# Patient Record
Sex: FEMALE | Race: WHITE | Employment: UNEMPLOYED | URBAN - METROPOLITAN AREA
[De-identification: names, ages, dates, MRNs, and addresses within clinical notes are randomized per-mention and may not be internally consistent; named-entity substitution may affect disease eponyms.]

---

## 2013-08-30 LAB — COLONOSCOPY, EXTERNAL: NORMAL

## 2017-01-16 ENCOUNTER — OFFICE VISIT (OUTPATIENT)
Dept: FAMILY MEDICINE CLINIC | Age: 67
End: 2017-01-16

## 2017-01-16 VITALS
BODY MASS INDEX: 27.89 KG/M2 | OXYGEN SATURATION: 98 % | WEIGHT: 184 LBS | TEMPERATURE: 98.3 F | SYSTOLIC BLOOD PRESSURE: 125 MMHG | DIASTOLIC BLOOD PRESSURE: 73 MMHG | HEIGHT: 68 IN | HEART RATE: 59 BPM | RESPIRATION RATE: 18 BRPM

## 2017-01-16 DIAGNOSIS — F32.A DEPRESSION, UNSPECIFIED DEPRESSION TYPE: Primary | ICD-10-CM

## 2017-01-16 RX ORDER — FLUOXETINE HYDROCHLORIDE 40 MG/1
CAPSULE ORAL
Qty: 30 CAP | Refills: 5 | Status: SHIPPED | OUTPATIENT
Start: 2017-01-16 | End: 2017-09-30 | Stop reason: SDUPTHER

## 2017-01-16 RX ORDER — ARIPIPRAZOLE 2 MG/1
2 TABLET ORAL DAILY
Qty: 30 TAB | Refills: 5 | Status: SHIPPED | OUTPATIENT
Start: 2017-01-16 | End: 2018-01-05 | Stop reason: SDUPTHER

## 2017-01-16 NOTE — PROGRESS NOTES
Pt here for medication evaluation on Prozac. Would like to discuss increasing the dose of RX. States that she has been taking 2 pills daily x 1 month. Reports that she has noticed improvement since taking 40mg.

## 2017-01-16 NOTE — PROGRESS NOTES
Pt here for medication evaluation on Prozac. Would like to discuss increasing the dose of RX. States that she has been taking 2 pills daily x 1 month. Reports that she has noticed improvement since taking 40mg. Pt reports that she increased dose due to feeling very burned out and down. Subjective: (As above and below)     Chief Complaint   Patient presents with    Medication Evaluation     she is a 77y.o. year old female who presents for evaluation. Reviewed PmHx, RxHx, FmHx, SocHx, AllgHx and updated in chart. Review of Systems - negative except as listed above    Objective:     Vitals:    01/16/17 1515   BP: 125/73   Pulse: (!) 59   Resp: 18   Temp: 98.3 °F (36.8 °C)   TempSrc: Oral   SpO2: 98%   Weight: 184 lb (83.5 kg)   Height: 5' 8\" (1.727 m)     Physical Examination: General appearance - alert, well appearing, and in no distress  Mental status - behavior, speech, dress, motor activity, and thought processes, depressed mood  Eyes - pupils equal and reactive, extraocular eye movements intact  Mouth - mucous membranes moist, pharynx normal without lesions  Chest - clear to auscultation, no wheezes, rales or rhonchi, symmetric air entry  Heart - normal rate, regular rhythm, normal S1, S2, no murmurs, rubs, clicks or gallops    Assessment/ Plan:   1. Depression, unspecified depression type  Orders Placed This Encounter    FLUoxetine (PROZAC) 40 mg capsule     Sig: TAKE ONE CAPSULE BY MOUTH ONCE DAILY     Dispense:  30 Cap     Refill:  5    ARIPiprazole (ABILIFY) 2 mg tablet     Sig: Take 1 Tab by mouth daily. Dispense:  30 Tab     Refill:  5     Continue increased dose, add abilify. Follow up in 4-6 weeks for eval of new medication      Follow-up Disposition: As needed  I have discussed the diagnosis with the patient and the intended plan as seen in the above orders. The patient has received an after-visit summary and questions were answered concerning future plans.      Medication Side Effects and Warnings were discussed with patient: yes  Patient Labs were reviewed: yes  Patient Past Records were reviewed:  yes    Farhan Hooper M.D.

## 2017-01-16 NOTE — MR AVS SNAPSHOT
Visit Information Date & Time Provider Department Dept. Phone Encounter #  
 1/16/2017  3:15 PM Steven Nguyen MD [address] [facility] [phone] [id number] Upcoming Health Maintenance Date Due COLONOSCOPY 11/28/1968 DTaP/Tdap/Td series (1 - Tdap) 11/28/1971 ZOSTER VACCINE AGE 60> 11/28/2010 GLAUCOMA SCREENING Q2Y 11/28/2015 INFLUENZA AGE 9 TO ADULT 8/1/2016 Pneumococcal 65+ Low/Medium Risk (2 of 2 - PCV13) 12/1/2016 MEDICARE YEARLY EXAM 12/1/2016 BREAST CANCER SCRN MAMMOGRAM 1/11/2018 Allergies as of 1/16/2017  Review Complete On: 1/16/2017 By: Steven Nguyen MD  
  
 Severity Noted Reaction Type Reactions Nsaids (Non-steroidal Anti-inflammatory Drug)  03/14/2014   Systemic Other (comments) Bleeding ulcers Percocet [Oxycodone-acetaminophen]  03/14/2014   Intolerance Nausea and Vomiting Current Immunizations  Reviewed on 12/1/2015 Name Date Influenza Vaccine (Quad) PF 12/1/2015 Pneumococcal Polysaccharide (PPSV-23) 12/1/2015 Not reviewed this visit You Were Diagnosed With   
  
 Codes Comments Depression, unspecified depression type    -  Primary ICD-10-CM: F32.9 ICD-9-CM: 634 Vitals BP Pulse Temp Resp Height(growth percentile) Weight(growth percentile) 125/73 (BP 1 Location: Left arm, BP Patient Position: Sitting) (!) 59 98.3 °F (36.8 °C) (Oral) 18 5' 8\" (1.727 m) 184 lb (83.5 kg) SpO2 BMI OB Status Smoking Status 98% 27.98 kg/m2 Postmenopausal Never Smoker Vitals History BMI and BSA Data Body Mass Index Body Surface Area  
 27.98 kg/m 2 2 m 2 Preferred Pharmacy Pharmacy Name Phone [id number] [place] [address] [phone] Your Updated Medication List  
  
   
This list is accurate as of: 1/16/17  3:24 PM.  Always use your most recent med list.  
  
  
  
  
 alendronate 70 mg tablet Commonly known as:  FOSAMAX TAKE ONE TABLET BY MOUTH EVERY SUNDAY ARIPiprazole 2 mg tablet Commonly known as:  ABILIFY Take 1 Tab by mouth daily. Cyanocobalamin (Vitamin B-12) 250 mcg Chew Take 1 Cap by mouth daily. ergocalciferol 50,000 unit capsule Commonly known as:  ERGOCALCIFEROL  
TAKE ONE CAPSULE BY MOUTH EVERY SEVEN DAYS FLUoxetine 40 mg capsule Commonly known as:  PROzac TAKE ONE CAPSULE BY MOUTH ONCE DAILY FOLIC ACID PO Take 1 Tab by mouth daily. Iron 325 mg (65 mg iron) tablet Generic drug:  ferrous sulfate Take 325 mg by mouth Daily (before breakfast). multivitamin tablet Commonly known as:  ONE A DAY Take 1 Tab by mouth daily. mupirocin 2 % ointment Commonly known as:  [employer] Apply to affected area on right wrist up to twice daily. nystatin 100,000 unit/mL suspension Commonly known as:  MYCOSTATIN Prescriptions Sent to Pharmacy Refills FLUoxetine (PROZAC) 40 mg capsule 5 Sig: TAKE ONE CAPSULE BY MOUTH ONCE DAILY Class: Normal  
 Pharmacy: [address] Ph #: [phone] ARIPiprazole (ABILIFY) 2 mg tablet 5 Sig: Take 1 Tab by mouth daily. Class: Normal  
 Pharmacy: [place] [id number], [address] [place] [address] Ph #: [phone] Route: Oral  
  
Introducing [facility]! [employer] introduces [facility] patient portal. Now you can access parts of your medical record, email your doctor's office, and request medication refills online. 1. In your internet browser, go to [email or web address] 2. Click on the First Time User? Click Here link in the Sign In box. You will see the New Member Sign Up page. 3. Enter your [facility] Access Code exactly as it appears below. You will not need to use this code after youve completed the sign-up process.  If you do not sign up before the expiration date, you must request a new code. · [employer] Access Code: [email or web address] Expires: 4/16/2017  3:24 PM 
 
4. Enter the last four digits of your Social Security Number (xxxx) and Date of Birth (mm/dd/yyyy) as indicated and click Submit. You will be taken to the next sign-up page. 5. Create a [employer] ID. This will be your [employer] login ID and cannot be changed, so think of one that is secure and easy to remember. 6. Create a [employer] password. You can change your password at any time. 7. Enter your Password Reset Question and Answer. This can be used at a later time if you forget your password. 8. Enter your e-mail address. You will receive e-mail notification when new information is available in [address] 9. Click Sign Up. You can now view and download portions of your medical record. 10. Click the Download Summary menu link to download a portable copy of your medical information. If you have questions, please visit the Frequently Asked Questions section of the [employer] website. Remember, [employer] is NOT to be used for urgent needs. For medical emergencies, dial 911. Now available from your iPhone and Android! Please provide this summary of care documentation to your next provider. Your primary care clinician is listed as Uziel Luke. If you have any questions after today's visit, please call [phone].

## 2017-09-11 ENCOUNTER — APPOINTMENT (OUTPATIENT)
Dept: CT IMAGING | Age: 67
End: 2017-09-11
Attending: EMERGENCY MEDICINE
Payer: MEDICARE

## 2017-09-11 ENCOUNTER — HOSPITAL ENCOUNTER (EMERGENCY)
Age: 67
Discharge: HOME | End: 2017-09-11
Attending: EMERGENCY MEDICINE
Payer: MEDICARE

## 2017-09-11 VITALS
DIASTOLIC BLOOD PRESSURE: 71 MMHG | OXYGEN SATURATION: 98 % | RESPIRATION RATE: 21 BRPM | TEMPERATURE: 98.6 F | HEIGHT: 68 IN | SYSTOLIC BLOOD PRESSURE: 122 MMHG | WEIGHT: 174 LBS | HEART RATE: 80 BPM | BODY MASS INDEX: 26.37 KG/M2

## 2017-09-11 DIAGNOSIS — R07.89 RIGHT-SIDED CHEST WALL PAIN: Primary | ICD-10-CM

## 2017-09-11 LAB
ANION GAP SERPL CALC-SCNC: 8 MMOL/L (ref 5–15)
ATRIAL RATE: 59 BPM
BASOPHILS # BLD: 0 K/UL (ref 0–0.1)
BASOPHILS NFR BLD: 0 % (ref 0–1)
BUN SERPL-MCNC: 15 MG/DL (ref 6–20)
BUN/CREAT SERPL: 25 (ref 12–20)
CALCIUM SERPL-MCNC: 8.5 MG/DL (ref 8.5–10.1)
CALCULATED P AXIS, ECG09: -10 DEGREES
CALCULATED R AXIS, ECG10: 54 DEGREES
CALCULATED T AXIS, ECG11: 30 DEGREES
CHLORIDE SERPL-SCNC: 106 MMOL/L (ref 97–108)
CO2 SERPL-SCNC: 26 MMOL/L (ref 21–32)
CREAT SERPL-MCNC: 0.6 MG/DL (ref 0.55–1.02)
DIAGNOSIS, 93000: NORMAL
DIFFERENTIAL METHOD BLD: ABNORMAL
EOSINOPHIL # BLD: 0.1 K/UL (ref 0–0.4)
EOSINOPHIL NFR BLD: 2 % (ref 0–7)
ERYTHROCYTE [DISTWIDTH] IN BLOOD BY AUTOMATED COUNT: 19 % (ref 11.5–14.5)
GLUCOSE SERPL-MCNC: 87 MG/DL (ref 65–100)
HCT VFR BLD AUTO: 29.5 % (ref 35–47)
HGB BLD-MCNC: 8.1 G/DL (ref 11.5–16)
LYMPHOCYTES # BLD: 1.6 K/UL (ref 0.8–3.5)
LYMPHOCYTES NFR BLD: 27 % (ref 12–49)
MCH RBC QN AUTO: 19.6 PG (ref 26–34)
MCHC RBC AUTO-ENTMCNC: 27.5 G/DL (ref 30–36.5)
MCV RBC AUTO: 71.3 FL (ref 80–99)
MONOCYTES # BLD: 0.4 K/UL (ref 0–1)
MONOCYTES NFR BLD: 7 % (ref 5–13)
NEUTS SEG # BLD: 3.8 K/UL (ref 1.8–8)
NEUTS SEG NFR BLD: 64 % (ref 32–75)
P-R INTERVAL, ECG05: 142 MS
PLATELET # BLD AUTO: 313 K/UL (ref 150–400)
POTASSIUM SERPL-SCNC: 4 MMOL/L (ref 3.5–5.1)
Q-T INTERVAL, ECG07: 414 MS
QRS DURATION, ECG06: 88 MS
QTC CALCULATION (BEZET), ECG08: 409 MS
RBC # BLD AUTO: 4.14 M/UL (ref 3.8–5.2)
RBC MORPH BLD: ABNORMAL
SODIUM SERPL-SCNC: 140 MMOL/L (ref 136–145)
TROPONIN I SERPL-MCNC: <0.04 NG/ML
VENTRICULAR RATE, ECG03: 59 BPM
WBC # BLD AUTO: 5.9 K/UL (ref 3.6–11)

## 2017-09-11 PROCEDURE — 93005 ELECTROCARDIOGRAM TRACING: CPT

## 2017-09-11 PROCEDURE — 80048 BASIC METABOLIC PNL TOTAL CA: CPT | Performed by: EMERGENCY MEDICINE

## 2017-09-11 PROCEDURE — 85025 COMPLETE CBC W/AUTO DIFF WBC: CPT | Performed by: EMERGENCY MEDICINE

## 2017-09-11 PROCEDURE — 36415 COLL VENOUS BLD VENIPUNCTURE: CPT | Performed by: EMERGENCY MEDICINE

## 2017-09-11 PROCEDURE — 84484 ASSAY OF TROPONIN QUANT: CPT | Performed by: EMERGENCY MEDICINE

## 2017-09-11 PROCEDURE — 99285 EMERGENCY DEPT VISIT HI MDM: CPT

## 2017-09-11 PROCEDURE — 74011636320 HC RX REV CODE- 636/320: Performed by: RADIOLOGY

## 2017-09-11 PROCEDURE — 71275 CT ANGIOGRAPHY CHEST: CPT

## 2017-09-11 RX ORDER — TRAMADOL HYDROCHLORIDE AND ACETAMINOPHEN 37.5; 325 MG/1; MG/1
1 TABLET ORAL
Qty: 20 TAB | Refills: 0 | Status: SHIPPED | OUTPATIENT
Start: 2017-09-11 | End: 2018-05-21

## 2017-09-11 RX ORDER — SODIUM CHLORIDE 0.9 % (FLUSH) 0.9 %
5-10 SYRINGE (ML) INJECTION AS NEEDED
Status: DISCONTINUED | OUTPATIENT
Start: 2017-09-11 | End: 2017-09-11 | Stop reason: HOSPADM

## 2017-09-11 RX ORDER — SODIUM CHLORIDE 0.9 % (FLUSH) 0.9 %
5-10 SYRINGE (ML) INJECTION EVERY 8 HOURS
Status: DISCONTINUED | OUTPATIENT
Start: 2017-09-11 | End: 2017-09-11 | Stop reason: HOSPADM

## 2017-09-11 RX ADMIN — IOPAMIDOL 80 ML: 755 INJECTION, SOLUTION INTRAVENOUS at 11:54

## 2017-09-11 NOTE — ED PROVIDER NOTES
HPI Comments: 77 y.o. female with past medical history significant for anemia, PE, hypotension, hypoglycemia, PUD, cellulitis, OA, left breast lumpectomy, and gastric bypass surgery who presents from home with chief complaint of CP. Pt complains of right anterior wall CP worse with movement and inhalation. Pt states, \"it feels like there is abscess not a pulled muscle. \"  Additionally, pt notes fever for the past week with Tmax: 101.2 yesterday. Pt reports chronic joint pain with no acute changes. Pt denies recent weight change or hx of CA. Pt denies leg pain or swelling. Pt denies cough, sore throat, rhinorrhea, or congestion. Pt denies recent abx. There are no other acute medical concerns at this time. Social hx: -Tobacco use -EtOH use  PCP: Yan Oliver NP    Note written by Dimitris Baker. Nathalie Speaks, as dictated by Alea Karimi MD 10:40 AM      The history is provided by the patient. No  was used.         Past Medical History:   Diagnosis Date    Anemia NEC     Arthritis     bilat hands    Black stools     Bowel obstruction ([address] 10/24/2011    Chronic pain     left ankle    Dyspepsia and other specified disorders of function of stomach     Hair loss     Hypoglycemia, unspecified     related to hx bi-pass surg    Hypotension 11/2011    Liver disease     Hepatitis as child food borne    Morbid obesity ([address]     gastric bypass    Other ill-defined conditions(799.89)     bronchitis occaisionally    Other ill-defined conditions(799.89) 2012    cellulitis right leg,     Other ill-defined conditions(799.89)     hx of hypotenion and bradycardia    Other ill-defined conditions(799.89)     osteoporosis    Other ill-defined conditions(799.89)     depression    Other ill-defined conditions(799.89) 12/12/12    MRSA    PUD (peptic ulcer disease) 5-2013    bleeding ulcer- from NSAIDS- no transfusion    Pulmonary embolus ([address] 11/3/2011    Teeth problem     Thromboembolus ([facility] 2011    post op from pic line, pe lung    Ulcers, marginal     GI       Past Surgical History:   Procedure Laterality Date    COLONOSCOPY  2013         EGD  2013         GASTRIC BYPASS,OBESE<150CM ROBERTO-EN-Y      HX BREAST BIOPSY  2014    LEFT NIPPLE EXPLORATION AND DUCTAL EXCISION, EXCISION SCALP MASS  performed by Brice Calzada MD at [address] HX BREAST LUMPECTOMY Left 2014    HX GASTRIC BYPASS      HX OPEN CHOLECYSTECTOMY      taaken with bypass    HX ORTHOPAEDIC      right ankle ORIF         Family History:   Problem Relation Age of Onset    Cancer Mother      colon    Heart Disease Father     Alcohol abuse Brother          Cancer Maternal Aunt      colon       Social History     Social History    Marital status:      Spouse name: N/A    Number of children: N/A    Years of education: N/A     Occupational History    Not on file. Social History Main Topics    Smoking status: Never Smoker    Smokeless tobacco: Never Used    Alcohol use No    Drug use: No    Sexual activity: Yes     Partners: Male     Other Topics Concern    Not on file     Social History Narrative         ALLERGIES: Nsaids (non-steroidal anti-inflammatory drug) and Percocet [oxycodone-acetaminophen]    Review of Systems   Constitutional: Positive for fever. Negative for appetite change and unexpected weight change. HENT: Negative. Negative for ear pain, hearing loss, nosebleeds, rhinorrhea, sore throat and trouble swallowing. Respiratory: Negative. Negative for cough, chest tightness and shortness of breath. Cardiovascular: Positive for chest pain (right anterior wall). Negative for palpitations. Gastrointestinal: Negative. Negative for abdominal distention, abdominal pain, blood in stool and vomiting. Endocrine: Negative. Genitourinary: Negative for dysuria and hematuria. Musculoskeletal: Negative. Negative for back pain and myalgias. Skin: Negative. Negative for rash. Allergic/Immunologic: Negative. Neurological: Negative. Negative for dizziness, syncope, weakness and numbness. Hematological: Negative. Psychiatric/Behavioral: Negative. All other systems reviewed and are negative. Vitals:    09/11/17 1029   BP: 126/58   Pulse: (!) 59   Resp: 16   Temp: 98.6 °F (37 °C)   SpO2: 98%   Weight: 78.9 kg (174 lb)   Height: 5' 8\" (1.727 m)            Physical Exam   Constitutional: She is oriented to person, place, and time. She appears well-developed and well-nourished. She appears distressed (moderate d/t right anterior CP). HENT:   Head: Normocephalic and atraumatic. Right Ear: External ear normal.   Left Ear: External ear normal.   Nose: Nose normal.   Mouth/Throat: Oropharynx is clear and moist.   Eyes: Conjunctivae and EOM are normal. Pupils are equal, round, and reactive to light. Neck: Normal range of motion. Neck supple. No JVD present. No thyromegaly present. Cardiovascular: Regular rhythm, normal heart sounds and intact distal pulses. No murmur heard. Pulmonary/Chest: Effort normal and breath sounds normal. No respiratory distress. She has no wheezes. She has no rales. She exhibits tenderness (very reproducible chest discomfort to palpation). No Crepitus. Breath sounds clear bilaterally. Abdominal: Soft. Bowel sounds are normal. She exhibits no distension. There is no tenderness. Musculoskeletal: Normal range of motion. She exhibits no edema or tenderness. Neurological: She is alert and oriented to person, place, and time. No cranial nerve deficit. Skin: Skin is warm and dry. No rash noted. Psychiatric: She has a normal mood and affect. Her behavior is normal. Thought content normal.      Note written by [facility]. Ramesh Rea, as dictated by Lauri Wilkins MD 11:00 AM      [facility]  ED Course       Procedures  ED EKG interpretation:  Rhythm: sinus bradycardia; Rate (approx.): 59;  Axis: normal; ST/T wave: normal; no ectopy. Note written by [facility]. Nathalie Speaks, as dictated by Alea Karimi MD 10:38 AM    PROGRESS NOTE:  1:13 PM  Troponin is negative. Chemistry is unremarkable. CBC shows a chronic anemia, HGB 8.1. Her chest CT shows no evidence of acute pulmonary embolus, no acute findings, and thoracic aorta is normal.    A/P: Chest wall pain with no clear etiology for stated fever, no indication for abx at this time. Will give short term analgesics and have pt f/u with PCP later this week.

## 2017-09-11 NOTE — DISCHARGE INSTRUCTIONS
Musculoskeletal Chest Pain: Care Instructions  Your Care Instructions  Chest pain is not always a sign that something is wrong with your heart or that you have another serious problem. The doctor thinks your chest pain is caused by strained muscles or ligaments, inflamed chest cartilage, or another problem in your chest, rather than by your heart. You may need more tests to find the cause of your chest pain. Follow-up care is a key part of your treatment and safety. Be sure to make and go to all appointments, and call your doctor if you are having problems. Its also a good idea to know your test results and keep a list of the medicines you take. How can you care for yourself at home? · Take pain medicines exactly as directed. ¨ If the doctor gave you a prescription medicine for pain, take it as prescribed. ¨ If you are not taking a prescription pain medicine, ask your doctor if you can take an over-the-counter medicine. · Rest and protect the sore area. · Stop, change, or take a break from any activity that may be causing your pain or soreness. · Put ice or a cold pack on the sore area for 10 to 20 minutes at a time. Try to do this every 1 to 2 hours for the next 3 days (when you are awake) or until the swelling goes down. Put a thin cloth between the ice and your skin. · After 2 or 3 days, apply a heating pad set on low or a warm cloth to the area that hurts. Some doctors suggest that you go back and forth between hot and cold. · Do not wrap or tape your ribs for support. This may cause you to take smaller breaths, which could increase your risk of lung problems. · Mentholated creams such as Bengay or Icy Hot may soothe sore muscles. Follow the instructions on the package. · Follow your doctor's instructions for exercising. · Gentle stretching and massage may help you get better faster. Stretch slowly to the point just before pain begins, and hold the stretch for at least 15 to 30 seconds.  Do this 3 or 4 times a day. Stretch just after you have applied heat. · As your pain gets better, slowly return to your normal activities. Any increased pain may be a sign that you need to rest a while longer. When should you call for help? Call 911 anytime you think you may need emergency care. For example, call if:  · You have chest pain or pressure. This may occur with:  ¨ Sweating. ¨ Shortness of breath. ¨ Nausea or vomiting. ¨ Pain that spreads from the chest to the neck, jaw, or one or both shoulders or arms. ¨ Dizziness or lightheadedness. ¨ A fast or uneven pulse. After calling 911, chew 1 adult-strength aspirin. Wait for an ambulance. Do not try to drive yourself. · You have sudden chest pain and shortness of breath, or you cough up blood. Call your doctor now or seek immediate medical care if:  · You have any trouble breathing. · Your chest pain gets worse. · Your chest pain occurs consistently with exercise and is relieved by rest.  Watch closely for changes in your health, and be sure to contact your doctor if:  · Your chest pain does not get better after 1 week. Where can you learn more? Go to http://jamaal-roxie.info/. Enter V293 in the search box to learn more about \"Musculoskeletal Chest Pain: Care Instructions. \"  Current as of: March 20, 2017  Content Version: 11.3  © 8484-5740 [employer]. Care instructions adapted under license by [employer] (which disclaims liability or warranty for this information). If you have questions about a medical condition or this instruction, always ask your healthcare professional. [place] [address] any warranty or liability for your use of this information. We hope that we have addressed all of your medical concerns. The examination and treatment you received in the Emergency Department were for an emergent problem and were not intended as complete care.  It is important that you follow up with your healthcare provider(s) for ongoing care. If your symptoms worsen or do not improve as expected, and you are unable to reach your usual health care provider(s), you should return to the Emergency Department. Today's healthcare is undergoing tremendous change, and patient satisfaction surveys are one of the many tools to assess the quality of medical care. You may receive a survey from the [employer] organization regarding your experience in the Emergency Department. I hope that your experience has been completely positive, particularly the medical care that I provided. As such, please participate in the survey; anything less than excellent does not meet my expectations or intentions. [facility] and [facility] participate in nationally recognized quality of care measures. If your blood pressure is greater than 120/80, as reported below, we urge that you seek medical care to address the potential of high blood pressure, commonly known as hypertension. Hypertension can be hereditary or can be caused by certain medical conditions, pain, stress, or \"white coat syndrome. \"       Please make an appointment with your health care provider(s) for follow up of your Emergency Department visit. VITALS:   Patient Vitals for the past 8 hrs:   Temp Pulse Resp BP SpO2   09/11/17 1130 - (!) 52 17 114/58 99 %   09/11/17 1115 - 62 14 105/57 100 %   09/11/17 1100 - - - 121/76 99 %   09/11/17 1029 98.6 °F (37 °C) (!) 59 16 126/58 98 %          Thank you for allowing us to provide you with medical care today. We realize that you have many choices for your emergency care needs. Please choose us in the future for any continued health care needs. Regards,           Paul Gutiérrez, [address] [place], [place] [address]y 20.   Office: [phone]            Recent Results (from the past 24 hour(s))   TROPONIN I    Collection Time: 09/11/17 10:53 AM   Result Value Ref Range    Troponin-I, Qt. <0.04 <5.05 ng/mL   METABOLIC PANEL, BASIC    Collection Time: 09/11/17 10:53 AM   Result Value Ref Range    Sodium 140 136 - 145 mmol/L    Potassium 4.0 3.5 - 5.1 mmol/L    Chloride 106 97 - 108 mmol/L    CO2 26 21 - 32 mmol/L    Anion gap 8 5 - 15 mmol/L    Glucose 87 65 - 100 mg/dL    BUN 15 6 - 20 MG/DL    Creatinine 0.60 0.55 - 1.02 MG/DL    BUN/Creatinine ratio 25 (H) 12 - 20      GFR est AA >60 >60 ml/min/1.73m2    GFR est non-AA >60 >60 ml/min/1.73m2    Calcium 8.5 8.5 - 10.1 MG/DL   CBC WITH AUTOMATED DIFF    Collection Time: 09/11/17 10:53 AM   Result Value Ref Range    WBC 5.9 3.6 - 11.0 K/uL    RBC 4.14 3.80 - 5.20 M/uL    HGB 8.1 (L) 11.5 - 16.0 g/dL    HCT 29.5 (L) 35.0 - 47.0 %    MCV 71.3 (L) 80.0 - 99.0 FL    MCH 19.6 (L) 26.0 - 34.0 PG    MCHC 27.5 (L) 30.0 - 36.5 g/dL    RDW 19.0 (H) 11.5 - 14.5 %    PLATELET 856 127 - 265 K/uL    NEUTROPHILS 64 32 - 75 %    LYMPHOCYTES 27 12 - 49 %    MONOCYTES 7 5 - 13 %    EOSINOPHILS 2 0 - 7 %    BASOPHILS 0 0 - 1 %    ABS. NEUTROPHILS 3.8 1.8 - 8.0 K/UL    ABS. LYMPHOCYTES 1.6 0.8 - 3.5 K/UL    ABS. MONOCYTES 0.4 0.0 - 1.0 K/UL    ABS. EOSINOPHILS 0.1 0.0 - 0.4 K/UL    ABS. BASOPHILS 0.0 0.0 - 0.1 K/UL    DF SMEAR SCANNED      RBC COMMENTS ANISOCYTOSIS  1+        RBC COMMENTS HYPOCHROMIA  2+        RBC COMMENTS MICROCYTOSIS  1+           Cta Chest W Or W Wo Cont    Result Date: 9/11/2017  INDICATION: Right-sided pleuritic chest pain with fever COMPARISON:None TECHNIQUE:  Routine noncontrast imaging the chest was performed for localization purposes. Then, following the uneventful intravenous administration of 80 cc [id number], thin helical axial images were obtained through the chest. 3D image postprocessing was performed. CT dose reduction was achieved through use of a standardized protocol tailored for this examination and automatic exposure control for dose modulation. FINDINGS: THYROID: No nodule.  MEDIASTINUM: No mass or lymphadenopathy. JENIFFER: No mass or lymphadenopathy. THORACIC AORTA: No dissection or aneurysm. PULMONARY ARTERIES: Main pulmonary artery is normal in caliber. No evidence of acute pulmonary emboli. TRACHEA/BRONCHI: Patent. ESOPHAGUS: No wall thickening or dilatation. HEART: Normal in size. PLEURA: No effusion or pneumothorax. LUNGS: No pulmonary nodule. Mild bilateral dependent atelectasis. INCIDENTALLY IMAGED UPPER ABDOMEN: Status post gastric bypass. BONES: No destructive bone lesion. ADDITIONAL COMMENTS: N/A     IMPRESSION: No evidence of acute pulmonary embolus. Mild bilateral dependent atelectasis. Status post gastric bypass.

## 2017-09-11 NOTE — ED TRIAGE NOTES
Patient c/o chest pain x 2 weeks that increases with movement and inhalation. Patient c/o fever x 1 week.

## 2017-10-01 RX ORDER — FLUOXETINE HYDROCHLORIDE 40 MG/1
CAPSULE ORAL
Qty: 30 CAP | Refills: 5 | Status: SHIPPED | OUTPATIENT
Start: 2017-10-01 | End: 2018-05-16 | Stop reason: SDUPTHER

## 2018-01-05 RX ORDER — ARIPIPRAZOLE 2 MG/1
TABLET ORAL
Qty: 30 TAB | Refills: 5 | Status: SHIPPED | COMMUNITY
Start: 2018-01-05 | End: 2019-02-04 | Stop reason: SDUPTHER

## 2018-01-16 ENCOUNTER — TELEPHONE (OUTPATIENT)
Dept: FAMILY MEDICINE CLINIC | Age: 68
End: 2018-01-16

## 2018-01-16 NOTE — TELEPHONE ENCOUNTER
Call placed to patient @ [phone] , voice mail left to return call to the office. If patient should return call, please inform patient there is no documentation provided regarding  the nature of the previous call she was referring to, this writer is unsure who called or why.

## 2018-01-16 NOTE — TELEPHONE ENCOUNTER
Pt states she received a phone call from office unsure of nature of call. Erik Scott of call could be related to [employer].  Cb # [phone]

## 2018-05-17 RX ORDER — FLUOXETINE HYDROCHLORIDE 40 MG/1
CAPSULE ORAL
Qty: 30 CAP | Refills: 5 | Status: SHIPPED | OUTPATIENT
Start: 2018-05-17 | End: 2018-05-21 | Stop reason: DRUGHIGH

## 2018-05-21 ENCOUNTER — OFFICE VISIT (OUTPATIENT)
Dept: FAMILY MEDICINE CLINIC | Age: 68
End: 2018-05-21

## 2018-05-21 VITALS
TEMPERATURE: 98.7 F | OXYGEN SATURATION: 99 % | HEART RATE: 72 BPM | WEIGHT: 180 LBS | DIASTOLIC BLOOD PRESSURE: 65 MMHG | BODY MASS INDEX: 27.28 KG/M2 | RESPIRATION RATE: 16 BRPM | HEIGHT: 68 IN | SYSTOLIC BLOOD PRESSURE: 110 MMHG

## 2018-05-21 DIAGNOSIS — Z98.84 S/P GASTRIC BYPASS: ICD-10-CM

## 2018-05-21 DIAGNOSIS — E55.9 VITAMIN D DEFICIENCY: ICD-10-CM

## 2018-05-21 DIAGNOSIS — E53.8 VITAMIN B12 DEFICIENCY: ICD-10-CM

## 2018-05-21 DIAGNOSIS — D50.9 IRON DEFICIENCY ANEMIA, UNSPECIFIED IRON DEFICIENCY ANEMIA TYPE: Primary | ICD-10-CM

## 2018-05-21 DIAGNOSIS — F32.A DEPRESSION, UNSPECIFIED DEPRESSION TYPE: ICD-10-CM

## 2018-05-21 PROBLEM — Z86.711 HX PULMONARY EMBOLISM: Status: ACTIVE | Noted: 2018-05-21

## 2018-05-21 RX ORDER — FLUOXETINE HYDROCHLORIDE 20 MG/1
60 CAPSULE ORAL DAILY
Qty: 90 CAP | Refills: 2 | Status: SHIPPED | OUTPATIENT
Start: 2018-05-21 | End: 2018-09-12 | Stop reason: SDUPTHER
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
## 2018-05-21 NOTE — PATIENT INSTRUCTIONS
Iron-Rich Diet: Care Instructions  Your Care Instructions    Your body needs iron to make hemoglobin. Hemoglobin is a substance in red blood cells that carries oxygen from the lungs to cells all through your body. If you do not get enough iron, your body makes fewer and smaller red blood cells. As a result, your body's cells may not get enough oxygen. Adult men need 8 milligrams of iron a day; adult women need 18 milligrams of iron a day. After menopause, women need 8 milligrams of iron a day. A pregnant woman needs 27 milligrams of iron a day. Infants and young children have higher iron needs relative to their size than other age groups. People who have lost blood because of ulcers or heavy menstrual periods may become very low in iron and may develop anemia. Most people can get the iron their bodies need by eating enough of certain iron-rich foods. Your doctor may recommend that you take an iron supplement along with eating an iron-rich diet. Follow-up care is a key part of your treatment and safety. Be sure to make and go to all appointments, and call your doctor if you are having problems. It's also a good idea to know your test results and keep a list of the medicines you take. How can you care for yourself at home? · Make iron-rich foods a part of your daily diet. Iron-rich foods include:  ¨ All meats, such as chicken, beef, lamb, pork, fish, and shellfish. Liver is especially high in iron. ¨ Leafy green vegetables. ¨ Raisins, peas, beans, lentils, barley, and eggs. ¨ Iron-fortified breakfast cereals. · Eat foods with vitamin C along with iron-rich foods. Vitamin C helps you absorb more iron from food. Drink a glass of orange juice or another citrus juice with your food. · Eat meat and vegetables or grains together. The iron in meat helps your body absorb the iron in other foods. Where can you learn more? Go to http://jamaal-roxie.info/.   Enter [id number] [id number] in the search box to learn more about \"Iron-Rich Diet: Care Instructions. \"  Current as of: May 12, 2017  Content Version: 11.4  © 3932-4241 Healthwise, [employer]. Care instructions adapted under license by [employer]erlife (which disclaims liability or warranty for this information). If you have questions about a medical condition or this instruction, always ask your healthcare professional. [address] any warranty or liability for your use of this information.

## 2018-05-21 NOTE — MR AVS SNAPSHOT
[address] [address] 
[phone] Patient: Brock Macdonald MRN:  [id number] Visit Information Date & Time Provider Department Dept. Phone Encounter #  
 5/21/2018  9:45 AM Zoila Mills NP [address] [facility] [phone] [id number] Follow-up Instructions Return if symptoms worsen or fail to improve. Upcoming Health Maintenance Date Due COLONOSCOPY 11/28/1968 DTaP/Tdap/Td series (1 - Tdap) 11/28/1971 ZOSTER VACCINE AGE 60> 9/28/2010 GLAUCOMA SCREENING Q2Y 11/28/2015 Pneumococcal 65+ Low/Medium Risk (2 of 2 - PCV13) 12/1/2016 BREAST CANCER SCRN MAMMOGRAM 1/11/2018 MEDICARE YEARLY EXAM 3/28/2018 Influenza Age 5 to Adult 8/1/2018 Allergies as of 5/21/2018  Review Complete On: 5/21/2018 By: Antonio Head LPN Severity Noted Reaction Type Reactions Nsaids (Non-steroidal Anti-inflammatory Drug)  03/14/2014   Systemic Other (comments) Bleeding ulcers Percocet [Oxycodone-acetaminophen]  03/14/2014   Intolerance Nausea and Vomiting Current Immunizations  Reviewed on 12/1/2015 Name Date Influenza Vaccine (Quad) PF 12/1/2015 Pneumococcal Polysaccharide (PPSV-23) 12/1/2015 Not reviewed this visit You Were Diagnosed With   
  
 Codes Comments Iron deficiency anemia, unspecified iron deficiency anemia type    -  Primary ICD-10-CM: D50.9 ICD-9-CM: 280.9 Vitamin D deficiency     ICD-10-CM: E55.9 ICD-9-CM: 268.9 Vitamin B12 deficiency     ICD-10-CM: E53.8 ICD-9-CM: 266.2 Depression, unspecified depression type     ICD-10-CM: F32.9 ICD-9-CM: 804 S/P gastric bypass     ICD-10-CM: H31.01 ICD-9-CM: V45.86 Vitals BP Pulse Temp Resp Height(growth percentile) Weight(growth percentile) 110/65 72 98.7 °F (37.1 °C) (Oral) 16 5' 8\" (1.727 m) 180 lb (81.6 kg) SpO2 BMI OB Status Smoking Status 99% 27.37 kg/m2 Postmenopausal Never Smoker Vitals History BMI and BSA Data Body Mass Index Body Surface Area  
 27.37 kg/m 2 1.98 m 2 Preferred Pharmacy Pharmacy Name Phone [address] [phone] Your Updated Medication List  
  
   
This list is accurate as of 5/21/18 10:12 AM.  Always use your most recent med list.  
  
  
  
  
 alendronate 70 mg tablet Commonly known as:  FOSAMAX TAKE ONE TABLET BY MOUTH EVERY SUNDAY ARIPiprazole 2 mg tablet Commonly known as:  ABILIFY TAKE ONE TABLET BY MOUTH ONCE DAILY Cyanocobalamin (Vitamin B-12) 250 mcg Chew Take 1 Cap by mouth daily. ergocalciferol 50,000 unit capsule Commonly known as:  ERGOCALCIFEROL  
TAKE ONE CAPSULE BY MOUTH EVERY SEVEN DAYS FLUoxetine 20 mg capsule Commonly known as:  PROzac Take 3 Caps by mouth daily. FOLIC ACID PO Take 1 Tab by mouth daily. multivitamin tablet Commonly known as:  ONE A DAY Take 1 Tab by mouth daily. Prescriptions Sent to Pharmacy Refills FLUoxetine (PROZAC) 20 mg capsule 2 Sig: Take 3 Caps by mouth daily. Class: Normal  
 Pharmacy: [facility] [address] Ph #: [phone] Route: Oral  
  
We Performed the Following FERRITIN [09046 CPT(R)] IRON PROFILE [id number] CPT(R)] VITAMIN B12 & FOLATE [95027 CPT(R)] VITAMIN D, 25 HYDROXY [id number] CPT(R)] Follow-up Instructions Return if symptoms worsen or fail to improve. Patient Instructions Iron-Rich Diet: Care Instructions Your Care Instructions Your body needs iron to make hemoglobin. Hemoglobin is a substance in red blood cells that carries oxygen from the lungs to cells all through your body.  If you do not get enough iron, your body makes fewer and smaller red blood cells. As a result, your body's cells may not get enough oxygen. Adult men need 8 milligrams of iron a day; adult women need 18 milligrams of iron a day. After menopause, women need 8 milligrams of iron a day. A pregnant woman needs 27 milligrams of iron a day. Infants and young children have higher iron needs relative to their size than other age groups. People who have lost blood because of ulcers or heavy menstrual periods may become very low in iron and may develop anemia. Most people can get the iron their bodies need by eating enough of certain iron-rich foods. Your doctor may recommend that you take an iron supplement along with eating an iron-rich diet. Follow-up care is a key part of your treatment and safety. Be sure to make and go to all appointments, and call your doctor if you are having problems. It's also a good idea to know your test results and keep a list of the medicines you take. How can you care for yourself at home? · Make iron-rich foods a part of your daily diet. Iron-rich foods include: ¨ All meats, such as chicken, beef, lamb, pork, fish, and shellfish. Liver is especially high in iron. ¨ Leafy green vegetables. ¨ Raisins, peas, beans, lentils, barley, and eggs. ¨ Iron-fortified breakfast cereals. · Eat foods with vitamin C along with iron-rich foods. Vitamin C helps you absorb more iron from food. Drink a glass of orange juice or another citrus juice with your food. · Eat meat and vegetables or grains together. The iron in meat helps your body absorb the iron in other foods. Where can you learn more? Go to http://jamaal-roxie.info/. Enter [id number] in the search box to learn more about \"Iron-Rich Diet: Care Instructions. \" Current as of: May 12, 2017 Content Version: 11.4 © 3041-2560 BuildingIQ.  Care instructions adapted under license by [employer] (which disclaims liability or warranty for this information). If you have questions about a medical condition or this instruction, always ask your healthcare professional. [address] any warranty or liability for your use of this information. Introducing [facility]! [employer] introduces [employer] patient portal. Now you can access parts of your medical record, email your doctor's office, and request medication refills online. 1. In your internet browser, go to [email or web address] 2. Click on the First Time User? Click Here link in the Sign In box. You will see the New Member Sign Up page. 3. Enter your [employer] Access Code exactly as it appears below. You will not need to use this code after youve completed the sign-up process. If you do not sign up before the expiration date, you must request a new code. · [employer] Access Code: [email or web address] Expires: 8/19/2018 10:12 AM 
 
4. Enter the last four digits of your Social Security Number (xxxx) and Date of Birth (mm/dd/yyyy) as indicated and click Submit. You will be taken to the next sign-up page. 5. Create a [employer] ID. This will be your [employer] login ID and cannot be changed, so think of one that is secure and easy to remember. 6. Create a [employer] password. You can change your password at any time. 7. Enter your Password Reset Question and Answer. This can be used at a later time if you forget your password. 8. Enter your e-mail address. You will receive e-mail notification when new information is available in [address] 9. Click Sign Up. You can now view and download portions of your medical record. 10. Click the Download Summary menu link to download a portable copy of your medical information. If you have questions, please visit the Frequently Asked Questions section of the [employer] website. Remember, [employer] is NOT to be used for urgent needs. For medical emergencies, dial 911. Now available from your iPhone and Android! Please provide this summary of care documentation to your next provider. Your primary care clinician is listed as rIish Salinas. If you have any questions after today's visit, please call [phone].

## 2018-05-21 NOTE — LETTER
5/30/2018 3:46 PM 
 
Ms. Booker Estrada [address] [place] [id number] Dear Booker Estrada: 
 
Please find your most recent results below. Resulted Orders IRON PROFILE Result Value Ref Range TIBC 439 250 - 450 ug/dL UIBC 426 (H) 118 - 369 ug/dL Iron 13 (L) 27 - 139 ug/dL Iron % saturation 3 (LL) 15 - 55 % Narrative Performed at:  [address]  [id number] : Tori Vicente MD, Phone:  [phone] VITAMIN B12 & FOLATE Result Value Ref Range Vitamin B12 159 (L) 232 - 1245 pg/mL Folate >20.0 >3.0 ng/mL Comment: A serum folate concentration of less than 3.1 ng/mL is 
considered to represent clinical deficiency. Narrative Performed at:  [address]  [id number] : Tori Vicente MD, Phone:  [phone] FERRITIN Result Value Ref Range Ferritin 6 (L) 15 - 150 ng/mL Narrative Performed at:  [address]  [id number] : Tori Vicente MD, Phone:  [phone] VITAMIN D, 25 HYDROXY Result Value Ref Range VITAMIN D, 25-HYDROXY 7.2 (L) 30.0 - 100.0 ng/mL Comment:  
   Vitamin D deficiency has been defined by the [address] [place] [address] practice guideline as a 
level of serum 25-OH vitamin D less than 20 ng/mL (1,2). The Endocrine Society went on to further define vitamin D 
insufficiency as a level between 21 and 29 ng/mL (2). 1. IOM ([place] of [employer]). 2010. Dietary reference 
   intakes for calcium and D. [facility]: The 
   [employer]. 2. Marisa MF, Carolyn NC, Kinza MARIN, et al. 
   Evaluation, treatment, and prevention of vitamin D 
   deficiency: an Endocrine Society clinical practice 
   guideline. JCEM. 2011 Jul; 96(7):1911-30. Narrative Performed at:  [address] , [place], [place]  [id number] : Nico Osman MD, Phone:  [phone] RECOMMENDATIONS: 
Please inform pt iron is still very low despite oral supplementation.  Recommend she F/U with Heme for possible iron transfusion.  I like Dr. Cam Major office at [phone]. [facility] has low Vit B12, may come here to start getting injections.  Vit D level also very low, will send in oral rx to pharmacy. Please call me if you have any questions: [phone] Sincerely, Tiffani Moran NP

## 2018-05-22 LAB
25(OH)D3+25(OH)D2 SERPL-MCNC: 7.2 NG/ML (ref 30–100)
FERRITIN SERPL-MCNC: 6 NG/ML (ref 15–150)
FOLATE SERPL-MCNC: >20 NG/ML
IRON SATN MFR SERPL: 3 % (ref 15–55)
IRON SERPL-MCNC: 13 UG/DL (ref 27–139)
TIBC SERPL-MCNC: 439 UG/DL (ref 250–450)
UIBC SERPL-MCNC: 426 UG/DL (ref 118–369)
VIT B12 SERPL-MCNC: 159 PG/ML (ref 232–1245)

## 2018-05-23 RX ORDER — ERGOCALCIFEROL 1.25 MG/1
50000 CAPSULE ORAL
Qty: 4 CAP | Refills: 2 | Status: SHIPPED | OUTPATIENT
Start: 2018-05-23 | End: 2018-07-31 | Stop reason: SDUPTHER

## 2018-05-23 NOTE — PROGRESS NOTES
Please inform pt iron is still very low despite oral supplementation. Recommend she F/U with Heme for possible iron transfusion. I like Dr. Tita Cruz office at [phone]. Also has low Vit B12, may come here to start getting injections. Vit D level also very low, will send in oral rx to pharmacy.    Thanks,  N

## 2018-06-05 ENCOUNTER — DOCUMENTATION ONLY (OUTPATIENT)
Dept: FAMILY MEDICINE CLINIC | Age: 68
End: 2018-06-05

## 2018-06-05 NOTE — PROGRESS NOTES
Chart Squad request for medical records [facility] [phone] to be processed.

## 2018-06-12 NOTE — PROGRESS NOTES
[facility] Squad request for medical records was faxed to Nick [phone] to be processed.

## 2018-06-15 ENCOUNTER — PATIENT OUTREACH (OUTPATIENT)
Dept: FAMILY MEDICINE CLINIC | Age: 68
End: 2018-06-15

## 2018-06-15 NOTE — PROGRESS NOTES
[address] Main Note  [phone]  Fax [phone]    Patient Name: Antonio Lindsay  YOB: 1950    Ambulatory Nurse Navigator's chart review reveals patient has completed [facility] 2018 Patient Health Assessment appointment with PCP. Printing notes for submission.

## 2018-06-21 ENCOUNTER — DOCUMENTATION ONLY (OUTPATIENT)
Dept: FAMILY MEDICINE CLINIC | Age: 68
End: 2018-06-21

## 2018-07-26 ENCOUNTER — OFFICE VISIT (OUTPATIENT)
Dept: FAMILY MEDICINE CLINIC | Age: 68
End: 2018-07-26

## 2018-07-26 VITALS
WEIGHT: 179.2 LBS | SYSTOLIC BLOOD PRESSURE: 105 MMHG | RESPIRATION RATE: 15 BRPM | HEART RATE: 62 BPM | HEIGHT: 68 IN | BODY MASS INDEX: 27.16 KG/M2 | TEMPERATURE: 98.6 F | OXYGEN SATURATION: 97 % | DIASTOLIC BLOOD PRESSURE: 54 MMHG

## 2018-07-26 DIAGNOSIS — D51.0 PERNICIOUS ANEMIA: Primary | ICD-10-CM

## 2018-07-26 DIAGNOSIS — E61.1 IRON DEFICIENCY: ICD-10-CM

## 2018-07-26 DIAGNOSIS — E55.9 VITAMIN D DEFICIENCY: ICD-10-CM

## 2018-07-26 RX ORDER — CYANOCOBALAMIN 1000 UG/ML
1000 INJECTION, SOLUTION INTRAMUSCULAR; SUBCUTANEOUS ONCE
Qty: 1 ML | Refills: 0
Start: 2018-07-26 | End: 2018-07-26 | Stop reason: SDUPTHER

## 2018-07-26 RX ORDER — ACETAMINOPHEN 500 MG
TABLET ORAL
COMMUNITY
Start: 2018-06-13

## 2018-07-26 RX ORDER — CYANOCOBALAMIN 1000 UG/ML
1000 INJECTION, SOLUTION INTRAMUSCULAR; SUBCUTANEOUS ONCE
Qty: 1 ML | Refills: 12 | Status: SHIPPED | OUTPATIENT
Start: 2018-07-26 | End: 2019-08-08 | Stop reason: SDUPTHER

## 2018-07-26 NOTE — MR AVS SNAPSHOT
[address] [address] 
[phone] Patient: Dennie Aldo MRN:  [id number] Visit Information Date & Time Provider Department Dept. Phone Encounter #  
 7/26/2018  9:00 AM Olvin Jacinto NP [address] [facility] [phone] [id number] Upcoming Health Maintenance Date Due COLONOSCOPY 11/28/1968 DTaP/Tdap/Td series (1 - Tdap) 11/28/1971 ZOSTER VACCINE AGE 60> 9/28/2010 GLAUCOMA SCREENING Q2Y 11/28/2015 Pneumococcal 65+ Low/Medium Risk (2 of 2 - PCV13) 12/1/2016 BREAST CANCER SCRN MAMMOGRAM 1/11/2018 Influenza Age 5 to Adult 8/1/2018 Allergies as of 7/26/2018  Review Complete On: 7/26/2018 By: Babak Ruth Severity Noted Reaction Type Reactions Nsaids (Non-steroidal Anti-inflammatory Drug)  03/14/2014   Systemic Other (comments) Bleeding ulcers Percocet [Oxycodone-acetaminophen]  03/14/2014   Intolerance Nausea and Vomiting Current Immunizations  Reviewed on 12/1/2015 Name Date Influenza Vaccine 10/6/2011 12:00 AM  
 Influenza Vaccine (Quad) PF 12/1/2015 Pneumococcal Polysaccharide (PPSV-23) 12/1/2015, 11/21/2011 12:00 AM  
  
 Not reviewed this visit You Were Diagnosed With   
  
 Codes Comments Pernicious anemia    -  Primary ICD-10-CM: D51.0 ICD-9-CM: 281.0 Vitamin D deficiency     ICD-10-CM: E55.9 ICD-9-CM: 268.9 Iron deficiency     ICD-10-CM: E61.1 ICD-9-CM: 280.9 Vitals BP Pulse Temp Resp Height(growth percentile) Weight(growth percentile) 105/54 62 98.6 °F (37 °C) (Oral) 15 5' 8\" (1.727 m) 179 lb 3.2 oz (81.3 kg) SpO2 BMI OB Status Smoking Status 97% 27.25 kg/m2 Postmenopausal Never Smoker Vitals History BMI and BSA Data Body Mass Index Body Surface Area  
 27.25 kg/m 2 1.97 m 2 Preferred Pharmacy Pharmacy Name Phone [address] [phone] Your Updated Medication List  
  
   
This list is accurate as of 18  9:55 AM.  Always use your most recent med list.  
  
  
  
  
 acetaminophen 500 mg tablet Commonly known as:  TYLENOL Take  by mouth. alendronate 70 mg tablet Commonly known as:  FOSAMAX TAKE ONE TABLET BY MOUTH EVERY  ARIPiprazole 2 mg tablet Commonly known as:  ABILIFY TAKE ONE TABLET BY MOUTH ONCE DAILY * cyanocobalamin 1,000 mcg/mL injection Commonly known as:  VITAMIN B-12  
1 mL by IntraMUSCular route once for 1 dose. Every 30 days * Cyanocobalamin (Vitamin B-12) 250 mcg Chew Take 1 Cap by mouth daily. ergocalciferol 50,000 unit capsule Commonly known as:  ERGOCALCIFEROL Take 1 Cap by mouth every seven (7) days. FLUoxetine 20 mg capsule Commonly known as:  PROzac Take 3 Caps by mouth daily. FOLIC ACID PO Take 1 Tab by mouth daily. multivitamin tablet Commonly known as:  ONE A DAY Take 1 Tab by mouth daily. Syringe with Needle (Disp) 3 mL 25 gauge x 1\" Syrg Commonly known as:  SYRINGE 3CC/25GX1\" To use with b12 injection monthly * Notice: This list has 2 medication(s) that are the same as other medications prescribed for you. Read the directions carefully, and ask your doctor or other care provider to review them with you. Prescriptions Sent to Pharmacy Refills  
 cyanocobalamin (VITAMIN B-12) 1,000 mcg/mL injection 12 Si mL by IntraMUSCular route once for 1 dose. Every 30 days Class: Normal  
 Pharmacy: [facility] [address] Ph #: [phone] Route: IntraMUSCular Syringe with Needle, Disp, (SYRINGE 3CC/25GX1\") 3 mL 25 gauge x 1\" syrg 5 Sig: To use with b12 injection monthly  Class: Normal  
 Pharmacy: [facility] [address] Ph #: [phone] We Performed the Following CBC WITH AUTOMATED DIFF [89144 CPT(R)] IRON PROFILE [id number] CPT(R)] THER/PROPH/DIAG INJECTION, SUBCUT/IM [id number] CPT(R)] VITAMIN B12 INJECTION [ [facility]] VITAMIN D, 25 HYDROXY E124331 CPT(R)] Introducing [facility]! Vinod Mejía introduces [employer] patient portal. Now you can access parts of your medical record, email your doctor's office, and request medication refills online. 1. In your internet browser, go to [email or web address] 2. Click on the First Time User? Click Here link in the Sign In box. You will see the New Member Sign Up page. 3. Enter your [employer] Access Code exactly as it appears below. You will not need to use this code after youve completed the sign-up process. If you do not sign up before the expiration date, you must request a new code. · [employer] Access Code: [email or web address] Expires: 8/19/2018 10:12 AM 
 
4. Enter the last four digits of your Social Security Number (xxxx) and Date of Birth (mm/dd/yyyy) as indicated and click Submit. You will be taken to the next sign-up page. 5. Create a Hotch[employer] ID. This will be your [employer] login ID and cannot be changed, so think of one that is secure and easy to remember. 6. Create a [employer] password. You can change your password at any time. 7. Enter your Password Reset Question and Answer. This can be used at a later time if you forget your password. 8. Enter your e-mail address. You will receive e-mail notification when new information is available in [address] 9. Click Sign Up. You can now view and download portions of your medical record. 10. Click the Download Summary menu link to download a portable copy of your medical information. If you have questions, please visit the Frequently Asked Questions section of the [employer] website. Remember, [employer] is NOT to be used for urgent needs. For medical emergencies, dial 911. Now available from your iPhone and Android! Please provide this summary of care documentation to your next provider. Your primary care clinician is listed as Edwin Diamond. If you have any questions after today's visit, please call [phone].
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
## 2018-07-26 NOTE — PROGRESS NOTES
HISTORY OF PRESENT ILLNESS  Severiano Curt is a 79 y.o. female. HPI  She is here for repeat labs  Had iron infusion in June prior to hernia repair  Fatigue has been severe  Known low vit D and b12  Restarted a weekly vit D, has not had her b12 shots yet, would like to do her own at home,, she is an RN    ROS  A comprehensive review of system was obtained and negative except findings in the HPI    Visit Vitals    /54    Pulse 62    Temp 98.6 °F (37 °C) (Oral)    Resp 15    Ht 5' 8\" (1.727 m)    Wt 179 lb 3.2 oz (81.3 kg)    SpO2 97%    BMI 27.25 kg/m2     Physical Exam   Constitutional: She is oriented to person, place, and time. She appears well-developed and well-nourished. Neck: No JVD present. Cardiovascular: Normal rate, regular rhythm and intact distal pulses. Exam reveals no gallop and no friction rub. No murmur heard. Pulmonary/Chest: Effort normal and breath sounds normal. No respiratory distress. She has no wheezes. Musculoskeletal: She exhibits no edema. Neurological: She is alert and oriented to person, place, and time. Skin: Skin is warm. Nursing note and vitals reviewed. ASSESSMENT and PLAN  Encounter Diagnoses   Name Primary?     Pernicious anemia Yes    Vitamin D deficiency     Iron deficiency      Orders Placed This Encounter    THER/PROPH/DIAG INJ, SC/IM ([id number])    VITAMIN D, 25 HYDROXY    CBC WITH AUTOMATED DIFF    IRON PROFILE    acetaminophen (TYLENOL) 500 mg tablet    DISCONTD: cyanocobalamin (VITAMIN B-12) 1,000 mcg/mL injection    cyanocobalamin (VITAMIN B-12) 1,000 mcg/mL injection    Syringe with Needle, Disp, (SYRINGE 3CC/25GX1\") 3 mL 25 gauge x 1\" syrg     b12 shot today and given rx to do her own monthly injection at home  Labs updated for iron and vit D  She has appt with hem/onc next week, will take copy to her appt  Will plan to do well exam in Sept with  to update then    I have discussed the diagnosis with the patient and the intended plan as seen in the above orders. The patient has received an after-visit summary and questions were answered concerning future plans. Patient conveyed understanding of the plan at the time of the visit.     Zaida Chaney, MSN, ANP  7/26/2018

## 2018-07-27 LAB
25(OH)D3+25(OH)D2 SERPL-MCNC: 11 NG/ML (ref 30–100)
BASOPHILS # BLD AUTO: 0 X10E3/UL (ref 0–0.2)
BASOPHILS NFR BLD AUTO: 1 %
EOSINOPHIL # BLD AUTO: 0 X10E3/UL (ref 0–0.4)
EOSINOPHIL NFR BLD AUTO: 1 %
ERYTHROCYTE [DISTWIDTH] IN BLOOD BY AUTOMATED COUNT: 24.1 % (ref 12.3–15.4)
HCT VFR BLD AUTO: 34.1 % (ref 34–46.6)
HGB BLD-MCNC: 9.9 G/DL (ref 11.1–15.9)
IMM GRANULOCYTES # BLD: 0 X10E3/UL (ref 0–0.1)
IMM GRANULOCYTES NFR BLD: 0 %
IRON SATN MFR SERPL: 4 % (ref 15–55)
IRON SERPL-MCNC: 18 UG/DL (ref 27–139)
LYMPHOCYTES # BLD AUTO: 1.3 X10E3/UL (ref 0.7–3.1)
LYMPHOCYTES NFR BLD AUTO: 32 %
MCH RBC QN AUTO: 21.4 PG (ref 26.6–33)
MCHC RBC AUTO-ENTMCNC: 29 G/DL (ref 31.5–35.7)
MCV RBC AUTO: 74 FL (ref 79–97)
MONOCYTES # BLD AUTO: 0.4 X10E3/UL (ref 0.1–0.9)
MONOCYTES NFR BLD AUTO: 10 %
MORPHOLOGY BLD-IMP: ABNORMAL
NEUTROPHILS # BLD AUTO: 2.3 X10E3/UL (ref 1.4–7)
NEUTROPHILS NFR BLD AUTO: 56 %
PLATELET # BLD AUTO: 330 X10E3/UL (ref 150–379)
RBC # BLD AUTO: 4.62 X10E6/UL (ref 3.77–5.28)
TIBC SERPL-MCNC: 415 UG/DL (ref 250–450)
UIBC SERPL-MCNC: 397 UG/DL (ref 118–369)
WBC # BLD AUTO: 4.1 X10E3/UL (ref 3.4–10.8)

## 2018-07-31 RX ORDER — ERGOCALCIFEROL 1.25 MG/1
CAPSULE ORAL
Qty: 14 CAP | Refills: 0 | Status: SHIPPED | OUTPATIENT
Start: 2018-07-31 | End: 2019-02-04 | Stop reason: SDUPTHER

## 2018-07-31 NOTE — PROGRESS NOTES
Please let her know that her iron is still low but better, needs to consider another infusion which would be seeing Hem/Onc again. She needs to make an appointment with them to discuss. Her vit D is critically low, I want her to take this every day for 1 week then take twice a week, find out if she needs a refill.  Keenan Ferrari

## 2018-10-16 ENCOUNTER — OFFICE VISIT (OUTPATIENT)
Dept: FAMILY MEDICINE CLINIC | Age: 68
End: 2018-10-16

## 2018-10-16 VITALS
TEMPERATURE: 98.5 F | RESPIRATION RATE: 18 BRPM | HEIGHT: 68 IN | DIASTOLIC BLOOD PRESSURE: 70 MMHG | WEIGHT: 180 LBS | OXYGEN SATURATION: 99 % | HEART RATE: 54 BPM | BODY MASS INDEX: 27.28 KG/M2 | SYSTOLIC BLOOD PRESSURE: 112 MMHG

## 2018-10-16 DIAGNOSIS — D50.9 IRON DEFICIENCY ANEMIA, UNSPECIFIED IRON DEFICIENCY ANEMIA TYPE: ICD-10-CM

## 2018-10-16 DIAGNOSIS — Z00.00 WELL ADULT EXAM: Primary | ICD-10-CM

## 2018-10-16 DIAGNOSIS — Z23 ENCOUNTER FOR IMMUNIZATION: ICD-10-CM

## 2018-10-16 DIAGNOSIS — E55.9 VITAMIN D DEFICIENCY: ICD-10-CM

## 2018-10-16 DIAGNOSIS — M81.0 OSTEOPOROSIS WITHOUT CURRENT PATHOLOGICAL FRACTURE, UNSPECIFIED OSTEOPOROSIS TYPE: ICD-10-CM

## 2018-10-16 DIAGNOSIS — D51.0 PERNICIOUS ANEMIA: ICD-10-CM

## 2018-10-16 DIAGNOSIS — Z12.31 SCREENING MAMMOGRAM, ENCOUNTER FOR: ICD-10-CM

## 2018-10-16 DIAGNOSIS — Z98.84 S/P GASTRIC BYPASS: ICD-10-CM

## 2018-10-16 NOTE — LETTER
10/21/2018 4:25 PM 
 
Ms. Gilda Goldman 42 [address] [place] [id number] Dear Gilda Goldman: 
 
Please find your most recent results below. Resulted Orders LIPID PANEL Result Value Ref Range Cholesterol, total 125 100 - 199 mg/dL Triglyceride 72 0 - 149 mg/dL HDL Cholesterol 55 >39 mg/dL LDL, calculated 56 0 - 99 mg/dL METABOLIC PANEL, COMPREHENSIVE Result Value Ref Range Glucose 79 65 - 99 mg/dL BUN 14 8 - 27 mg/dL Creatinine 0.62 0.57 - 1.00 mg/dL GFR est non-AA 94 >59 mL/min/1.73 GFR est  >59 mL/min/1.73  
 BUN/Creatinine ratio 23 12 - 28 Sodium 139 134 - 144 mmol/L Potassium 4.5 3.5 - 5.2 mmol/L Chloride 104 96 - 106 mmol/L  
 CO2 25 20 - 29 mmol/L Calcium 8.9 8.7 - 10.3 mg/dL Protein, total 6.0 6.0 - 8.5 g/dL Albumin 3.9 3.6 - 4.8 g/dL A-G Ratio 1.9 1.2 - 2.2 Bilirubin, total 0.6 0.0 - 1.2 mg/dL Alk. phosphatase 141 (H) 39 - 117 IU/L  
 AST (SGOT) 15 0 - 40 IU/L  
 ALT (SGPT) 14 0 - 32 IU/L Narrative TSH 3RD GENERATION Result Value Ref Range TSH 0.692 0.450 - 4.500 uIU/mL VITAMIN B12 Result Value Ref Range Vitamin B12 344 232 - 1,245 pg/mL VITAMIN D, 25 HYDROXY Result Value Ref Range VITAMIN D, 25-HYDROXY 25.9 (L) 30.0 - 100.0 ng/mL FERRITIN Result Value Ref Range Ferritin 111 15 - 150 ng/mL CBC WITH AUTOMATED DIFF Result Value Ref Range WBC 4.7 3.4 - 10.8 x10E3/uL  
 RBC 4.61 3.77 - 5.28 x10E6/uL HGB 12.1 11.1 - 15.9 g/dL HCT 40.5 34.0 - 46.6 % MCV 88 79 - 97 fL  
 MCH 26.2 (L) 26.6 - 33.0 pg  
 MCHC 29.9 (L) 31.5 - 35.7 g/dL RDW 23.3 (H) 12.3 - 15.4 % PLATELET 875 243 - 009 x10E3/uL RECOMMENDATIONS: 
None. Keep up the good work! Work on diet and exercise. Your iron b12 and vitamin D have all improved from last visit. Recheck this test: 6 months. Please call me if you have any questions: [phone] Sincerely, 
 
 
Abigail Mendez NP

## 2018-10-16 NOTE — MR AVS SNAPSHOT
[address] [place] [address] 
[phone] Patient: [facility] MRN:  [id number] Visit Information Date & Time Provider Department Dept. Phone Encounter #  
 10/16/2018  7:30 AM Suzie Roque, NP [address] [facility] [phone] [id number] Upcoming Health Maintenance Date Due COLONOSCOPY 11/28/1968 DTaP/Tdap/Td series (1 - Tdap) 11/28/1971 Shingrix Vaccine Age 50> (1 of 2) 11/28/2000 GLAUCOMA SCREENING Q2Y 11/28/2015 Pneumococcal 65+ Low/Medium Risk (2 of 2 - PCV13) 12/1/2016 BREAST CANCER SCRN MAMMOGRAM 1/11/2018 Influenza Age 5 to Adult 8/1/2018 MEDICARE YEARLY EXAM 10/12/2018 Allergies as of 10/16/2018  Review Complete On: 10/16/2018 By: Jay Zimmer, LPN Severity Noted Reaction Type Reactions Nsaids (Non-steroidal Anti-inflammatory Drug)  03/14/2014   Systemic Other (comments) Bleeding ulcers Percocet [Oxycodone-acetaminophen]  03/14/2014   Intolerance Nausea and Vomiting Current Immunizations  Reviewed on 12/1/2015 Name Date Influenza Vaccine 10/6/2011 12:00 AM  
 Influenza Vaccine (Quad) PF 12/1/2015 Influenza Vaccine (Tri) Adjuvanted  Incomplete Pneumococcal Polysaccharide (PPSV-23) 12/1/2015, 11/21/2011 12:00 AM  
  
 Not reviewed this visit You Were Diagnosed With   
  
 Codes Comments Well adult exam    -  Primary ICD-10-CM: Z00.00 ICD-9-CM: V70.0 Vitamin D deficiency     ICD-10-CM: E55.9 ICD-9-CM: 268.9 Iron deficiency anemia, unspecified iron deficiency anemia type     ICD-10-CM: D50.9 ICD-9-CM: 280.9 Pernicious anemia     ICD-10-CM: D51.0 ICD-9-CM: 281.0 S/P gastric bypass     ICD-10-CM: J02.02 ICD-9-CM: V45.86 Osteoporosis without current pathological fracture, unspecified osteoporosis type     ICD-10-CM: M81.0 ICD-9-CM: 733.00  Screening mammogram, encounter for     ICD-10-CM: Z12.31 
 ICD-9-CM: F12.68 Encounter for immunization     ICD-10-CM: U10 ICD-9-CM: V03.89 Vitals BP Pulse Temp Resp Height(growth percentile) Weight(growth percentile) 112/70 (!) 54 98.5 °F (36.9 °C) 18 5' 8\" (1.727 m) 180 lb (81.6 kg) SpO2 BMI OB Status Smoking Status 99% 27.37 kg/m2 Postmenopausal Never Smoker Vitals History BMI and BSA Data Body Mass Index Body Surface Area  
 27.37 kg/m 2 1.98 m 2 Preferred Pharmacy Pharmacy Name Phone [address] [phone] Your Updated Medication List  
  
   
This list is accurate as of 10/16/18  8:09 AM.  Always use your most recent med list.  
  
  
  
  
 acetaminophen 500 mg tablet Commonly known as:  TYLENOL Take  by mouth. alendronate 70 mg tablet Commonly known as:  FOSAMAX TAKE ONE TABLET BY MOUTH EVERY SUNDAY ARIPiprazole 2 mg tablet Commonly known as:  ABILIFY TAKE ONE TABLET BY MOUTH ONCE DAILY * VITAMIN B-12 INJECTION  
by Injection route. Has been getting them done @@ [facility] * Cyanocobalamin (Vitamin B-12) 250 mcg Chew Take 1 Cap by mouth daily. Not taking,  
  
 ergocalciferol 50,000 unit capsule Commonly known as:  ERGOCALCIFEROL One tab daily x 7 days then one pill twice a week on Tuesday and Friday FLUoxetine 20 mg capsule Commonly known as:  PROzac TAKE 3 CAPSULES BY MOUTH ONCE DAILY FOLIC ACID PO Take 1 Tab by mouth daily. multivitamin tablet Commonly known as:  ONE A DAY Take 1 Tab by mouth daily. pneumococcal 13 morena conj dip 0.5 mL Syrg injection Commonly known as:  PREVNAR-13  
0.5 mL by IntraMUSCular route once for 1 dose. Syringe with Needle (Disp) 3 mL 25 gauge x 1\" Syrg Commonly known as:  SYRINGE 3CC/25GX1\" To use with b12 injection monthly * Notice:   This list has 2 medication(s) that are the same as other medications prescribed for you. Read the directions carefully, and ask your doctor or other care provider to review them with you. Prescriptions Printed Refills  
 pneumococcal 13 morena conj dip (PREVNAR-13) 0.5 mL syrg injection 0 Si.5 mL by IntraMUSCular route once for 1 dose. Class: Print Route: IntraMUSCular We Performed the Following ADMIN INFLUENZA VIRUS VAC [ [facility]] CBC WITH AUTOMATED DIFF [49029 CPT(R)] FERRITIN [34080 CPT(R)] INFLUENZA VACCINE INACTIVATED (IIV), SUBUNIT, ADJUVANTED, IM [id number] CPT(R)] LIPID PANEL [69404 CPT(R)] METABOLIC PANEL, COMPREHENSIVE [86548 CPT(R)] TSH 3RD GENERATION [14782 CPT(R)] VITAMIN B12 [id number] CPT(R)] VITAMIN D, 25 HYDROXY [id number] CPT(R)] To-Do List   
 2018 Imaging:  [facility] MAMMO BI SCREENING INCL CAD   
  
 2018 Imaging:  DEXA BONE DENSITY STUDY AXIAL Introducing [facility]! [facility] introduces YuanV patient portal. Now you can access parts of your medical record, email your doctor's office, and request medication refills online. 1. In your internet browser, go to [email or web address] 2. Click on the First Time User? Click Here link in the Sign In box. You will see the New Member Sign Up page. 3. Enter your YuanV Access Code exactly as it appears below. You will not need to use this code after youve completed the sign-up process. If you do not sign up before the expiration date, you must request a new code. · YuanV Access Code: [email or web address] Expires: 2019  8:09 AM 
 
4. Enter the last four digits of your Social Security Number (xxxx) and Date of Birth (mm/dd/yyyy) as indicated and click Submit. You will be taken to the next sign-up page. 5. Create a YuanV ID. This will be your YuanV login ID and cannot be changed, so think of one that is secure and easy to remember. 6. Create a Vobi password. You can change your password at any time. 7. Enter your Password Reset Question and Answer. This can be used at a later time if you forget your password. 8. Enter your e-mail address. You will receive e-mail notification when new information is available in [address] 9. Click Sign Up. You can now view and download portions of your medical record. 10. Click the Download Summary menu link to download a portable copy of your medical information. If you have questions, please visit the Frequently Asked Questions section of the Vobi website. Remember, Vobi is NOT to be used for urgent needs. For medical emergencies, dial 911. Now available from your iPhone and Android! Please provide this summary of care documentation to your next provider. Your primary care clinician is listed as Whitney Londono. If you have any questions after today's visit, please call [phone].

## 2018-10-16 NOTE — PROGRESS NOTES
Chief Complaint Patient presents with [facility] Annual Wellness Visit  Labs 1. Have you been to the ER, urgent care clinic since your last visit? Hospitalized since your last visit? No 
 
2. Have you seen or consulted any other health care providers outside of the [facility] since your last visit? Include any pap smears or colon screening. [facility], Dr. Tiffanie Gross for iron and b12 infusions. Dr. Sarahi Pelayo surgeon the repaired hernia @ [facility].

## 2018-10-16 NOTE — PATIENT INSTRUCTIONS
Medicare Wellness Visit, Female The best way to live healthy is to have a lifestyle where you eat a well-balanced diet, exercise regularly, limit alcohol use, and quit all forms of tobacco/nicotine, if applicable. Regular preventive services are another way to keep healthy. Preventive services (vaccines, screening tests, monitoring & exams) can help personalize your care plan, which helps you manage your own care. Screening tests can find health problems at the earliest stages, when they are easiest to treat. [facility] follows the current, evidence-based guidelines published by the [facility] ([facility]) when recommending preventive services for our patients. Because we follow these guidelines, sometimes recommendations change over time as research supports it. (For example, mammograms used to be recommended annually. Even though Medicare will still pay for an annual mammogram, the newer guidelines recommend a mammogram every two years for women of average risk.) Of course, you and your doctor may decide to screen more often for some diseases, based on your risk and your health status. Preventive services for you include: - Medicare offers their members a free annual wellness visit, which is time for you and your primary care provider to discuss and plan for your preventive service needs. Take advantage of this benefit every year! 
-All adults over the age of 72 should receive the recommended pneumonia vaccines. Current USPSTF guidelines recommend a series of two vaccines for the best pneumonia protection.  
-All adults should have a flu vaccine yearly and a tetanus vaccine every 10 years. All adults age 61 and older should receive a shingles vaccine once in their lifetime.   
-A bone mass density test is recommended when a woman turns 65 to screen for osteoporosis. This test is only recommended one time, as a screening. Some providers will use this same test as a disease monitoring tool if you already have osteoporosis. -All adults age 38-68 who are overweight should have a diabetes screening test once every three years.  
-Other screening tests and preventive services for persons with diabetes include: an eye exam to screen for diabetic retinopathy, a kidney function test, a foot exam, and stricter control over your cholesterol.  
-Cardiovascular screening for adults with routine risk involves an electrocardiogram (ECG) at intervals determined by your doctor.  
-Colorectal cancer screenings should be done for adults age 54-65 with no increased risk factors for colorectal cancer. There are a number of acceptable methods of screening for this type of cancer. Each test has its own benefits and drawbacks. Discuss with your doctor what is most appropriate for you during your annual wellness visit. The different tests include: colonoscopy (considered the best screening method), a fecal occult blood test, a fecal DNA test, and sigmoidoscopy. -Breast cancer screenings are recommended every other year for women of normal risk, age 54-69. 
-Cervical cancer screenings for women over age 72 are only recommended with certain risk factors.  
-All adults born between [facility] should be screened once for Hepatitis C. Here is a list of your current Health Maintenance items (your personalized list of preventive services) with a due date: 
Health Maintenance Due Topic Date Due  
 Colonoscopy  11/28/1968  DTaP/Tdap/Td  (1 - Tdap) 11/28/1971  Shingles Vaccine (1 of 2) 11/28/2000  Glaucoma Screening   11/28/2015  Pneumococcal Vaccine (2 of 2 - PCV13) 12/01/2016  Breast Cancer Screening  01/11/2018  Flu Vaccine  08/01/2018 [address] [place] [address] Annual Well Visit  10/12/2018

## 2018-10-16 NOTE — PROGRESS NOTES
This is the Subsequent Medicare Annual Wellness Exam, performed 12 months or more after the Initial AWV or the last Subsequent AWV I have reviewed the patient's medical history in detail and updated the computerized patient record. History Past Medical History:  
Diagnosis Date  Anemia NEC  Arthritis   
 bilat hands  Black stools  Bowel obstruction ([address] 10/24/2011  Chronic pain   
 left ankle  Dyspepsia and other specified disorders of function of stomach   
 Hair loss  Hypoglycemia, unspecified   
 related to hx bi-pass surg  Hypotension 11/2011  Liver disease Hepatitis as child food borne  Morbid obesity ([address]   
 gastric bypass  Other ill-defined conditions(799.89) bronchitis occaisionally  Other ill-defined conditions(799.89) 2012  
 cellulitis right leg,   
 Other ill-defined conditions(799.89)   
 hx of hypotenion and bradycardia  Other ill-defined conditions(799.89) osteoporosis  Other ill-defined conditions(799.89) depression  Other ill-defined conditions(799.89) 12/12/12 MRSA  PUD (peptic ulcer disease) 5-2013  
 bleeding ulcer- from NSAIDS- no transfusion  Pulmonary embolus ([address] 11/3/2011  Teeth problem  Thromboembolus ([address] 2011  
 post op from pic line, pe lung  Ulcers, marginal   
 GI Past Surgical History:  
Procedure Laterality Date  COLONOSCOPY  8/30/2013  EGD  8/30/2013 [facility]  HX BREAST BIOPSY  7/14/2014 LEFT NIPPLE EXPLORATION AND DUCTAL EXCISION, EXCISION SCALP MASS  performed by Chauncey Powers MD at [address] [facility] HX BREAST LUMPECTOMY Left 08/2014 [address]  HX OPEN CHOLECYSTECTOMY    
 taaken with bypass  HX ORTHOPAEDIC    
 right ankle ORIF Current Outpatient Prescriptions Medication Sig Dispense Refill  cyanocobalamin, vitamin B-12, (VITAMIN B-12 INJECTION) by Injection route. Has been getting them done @@ [facility]  pneumococcal 13 morena conj dip (PREVNAR-13) 0.5 mL syrg injection 0.5 mL by IntraMUSCular route once for 1 dose. 0.5 mL 0  
 FLUoxetine (PROZAC) 20 mg capsule TAKE 3 CAPSULES BY MOUTH ONCE DAILY (Patient taking differently: taking 40mg once daily) 90 Cap 5  ergocalciferol (ERGOCALCIFEROL) 50,000 unit capsule One tab daily x 7 days then one pill twice a week on Tuesday and Friday 14 Cap 0  
 acetaminophen (TYLENOL) 500 mg tablet Take  by mouth.  Syringe with Needle, Disp, (SYRINGE 3CC/25GX1\") 3 mL 25 gauge x 1\" syrg To use with b12 injection monthly 1 Syringe 5  
 alendronate (FOSAMAX) 70 mg tablet TAKE ONE TABLET BY MOUTH EVERY  4 Tab 3  
 FOLIC ACID PO Take 1 Tab by mouth daily.  multivitamin (ONE A DAY) tablet Take 1 Tab by mouth daily.  ARIPiprazole (ABILIFY) 2 mg tablet TAKE ONE TABLET BY MOUTH ONCE DAILY (Patient taking differently: Not taking, made pt too sleepy) 30 Tab 5  Cyanocobalamin, Vitamin B-12, 250 mcg Chew Take 1 Cap by mouth daily. Not taking, Allergies Allergen Reactions  Nsaids (Non-Steroidal Anti-Inflammatory Drug) Other (comments) Bleeding ulcers  Percocet [Oxycodone-Acetaminophen] Nausea and Vomiting Family History Problem Relation Age of Onset  Cancer Mother   
  colon  Heart Disease Father  Alcohol abuse Brother   
    Cancer Maternal Aunt   
  colon Social History Substance Use Topics  Smoking status: Never Smoker  Smokeless tobacco: Never Used  Alcohol use No  
 
Patient Active Problem List  
Diagnosis Code  Vitamin D deficiency E55.9  Osteoporosis M81.0  Bloody discharge from left nipple N64.52  Sebaceous cyst L72.3  Scalp mass R22.0  Atypical mole D22.9  
 Hx pulmonary embolism Z86.711  S/P gastric bypass Z98.84  
 Osteoporosis without current pathological fracture M81.0 Depression Risk Factor Screening: PHQ over the last two weeks 10/16/2018 PHQ Not Done - Little interest or pleasure in doing things Not at all Feeling down, depressed, irritable, or hopeless Not at all Total Score PHQ 2 0 Alcohol Risk Factor Screening: You do not drink alcohol or very rarely. Functional Ability and Level of Safety:  
Hearing Loss Hearing is good. Activities of Daily Living The home contains: no safety equipment. Patient does total self care Fall Risk Fall Risk Assessment, last 12 mths 10/16/2018 Able to walk? Yes Fall in past 12 months? Yes Fall with injury? Yes  
Number of falls in past 12 months 1 Fall Risk Score 2 Abuse Screen Patient is not abused Cognitive Screening Evaluation of Cognitive Function: 
Has your family/caregiver stated any concerns about your memory: no 
Normal 
 
Patient Care Team  
Patient Care Team: 
Angus Washington NP as PCP - General (Family Practice) Visit Vitals  /70  Pulse (!) 54  Temp 98.5 °F (36.9 °C)  Resp 18  Ht 5' 8\" (1.727 m)  Wt 180 lb (81.6 kg)  SpO2 99%  BMI 27.37 kg/m2 Physical Examination:  
GENERAL ASSESSMENT: well developed and well nourished CHEST: normal air exchange, no rales, no rhonchi, no wheezes, respiratory effort normal with no retractions HEART: regular rate and rhythm, normal S1/S2, no murmurs Assessment/Plan Education and counseling provided: 
Are appropriate based on today's review and evaluation Diagnoses and all orders for this visit: 
 
1. Well adult exam 
-     LIPID PANEL 
-     METABOLIC PANEL, COMPREHENSIVE 
-     TSH 3RD GENERATION 
-     CBC WITH AUTOMATED DIFF 2. Vitamin D deficiency 
-     VITAMIN D, 25 HYDROXY 3. Iron deficiency anemia, unspecified iron deficiency anemia type -     FERRITIN 4. Pernicious anemia 
-     VITAMIN B12 
 
5. S/P gastric bypass 6. Osteoporosis without current pathological fracture, unspecified osteoporosis type -     DEXA BONE DENSITY STUDY AXIAL; Future 7. Screening mammogram, encounter for -     [facility] MAMMO BI SCREENING INCL CAD; Future 8. Encounter for immunization -     Influenza Vaccine Inactivated (IIV)(FLUAD), Subunit, Adjuvanted, IM, (23440) -     Administration fee () for Medicare insured patients Other orders -     pneumococcal 13 morena conj dip (PREVNAR-13) 0.5 mL syrg injection; 0.5 mL by IntraMUSCular route once for 1 dose. I have discussed the diagnosis with the patient and the intended plan as seen in the above orders. The patient has received an after-visit summary and questions were answered concerning future plans. Patient conveyed understanding of the plan at the time of the visit. Bobbi Nazario, MSN, ANP  10/16/2018

## 2018-10-17 LAB
25(OH)D3+25(OH)D2 SERPL-MCNC: 25.9 NG/ML (ref 30–100)
ALBUMIN SERPL-MCNC: 3.9 G/DL (ref 3.6–4.8)
ALBUMIN/GLOB SERPL: 1.9 {RATIO} (ref 1.2–2.2)
ALP SERPL-CCNC: 141 IU/L (ref 39–117)
ALT SERPL-CCNC: 14 IU/L (ref 0–32)
AST SERPL-CCNC: 15 IU/L (ref 0–40)
BASOPHILS # BLD AUTO: 0 X10E3/UL (ref 0–0.2)
BASOPHILS NFR BLD AUTO: 0 %
BILIRUB SERPL-MCNC: 0.6 MG/DL (ref 0–1.2)
BUN SERPL-MCNC: 14 MG/DL (ref 8–27)
BUN/CREAT SERPL: 23 (ref 12–28)
CALCIUM SERPL-MCNC: 8.9 MG/DL (ref 8.7–10.3)
CHLORIDE SERPL-SCNC: 104 MMOL/L (ref 96–106)
CHOLEST SERPL-MCNC: 125 MG/DL (ref 100–199)
CO2 SERPL-SCNC: 25 MMOL/L (ref 20–29)
CREAT SERPL-MCNC: 0.62 MG/DL (ref 0.57–1)
EOSINOPHIL # BLD AUTO: 0.1 X10E3/UL (ref 0–0.4)
EOSINOPHIL NFR BLD AUTO: 3 %
ERYTHROCYTE [DISTWIDTH] IN BLOOD BY AUTOMATED COUNT: 23.3 % (ref 12.3–15.4)
FERRITIN SERPL-MCNC: 111 NG/ML (ref 15–150)
GLOBULIN SER CALC-MCNC: 2.1 G/DL (ref 1.5–4.5)
GLUCOSE SERPL-MCNC: 79 MG/DL (ref 65–99)
HCT VFR BLD AUTO: 40.5 % (ref 34–46.6)
HDLC SERPL-MCNC: 55 MG/DL
HGB BLD-MCNC: 12.1 G/DL (ref 11.1–15.9)
IMM GRANULOCYTES # BLD: 0 X10E3/UL (ref 0–0.1)
IMM GRANULOCYTES NFR BLD: 0 %
INTERPRETATION, 910389: NORMAL
LDLC SERPL CALC-MCNC: 56 MG/DL (ref 0–99)
LYMPHOCYTES # BLD AUTO: 1.2 X10E3/UL (ref 0.7–3.1)
LYMPHOCYTES NFR BLD AUTO: 26 %
MCH RBC QN AUTO: 26.2 PG (ref 26.6–33)
MCHC RBC AUTO-ENTMCNC: 29.9 G/DL (ref 31.5–35.7)
MCV RBC AUTO: 88 FL (ref 79–97)
MONOCYTES # BLD AUTO: 0.3 X10E3/UL (ref 0.1–0.9)
MONOCYTES NFR BLD AUTO: 7 %
MORPHOLOGY BLD-IMP: ABNORMAL
NEUTROPHILS # BLD AUTO: 3 X10E3/UL (ref 1.4–7)
NEUTROPHILS NFR BLD AUTO: 64 %
PLATELET # BLD AUTO: 253 X10E3/UL (ref 150–379)
POTASSIUM SERPL-SCNC: 4.5 MMOL/L (ref 3.5–5.2)
PROT SERPL-MCNC: 6 G/DL (ref 6–8.5)
RBC # BLD AUTO: 4.61 X10E6/UL (ref 3.77–5.28)
SODIUM SERPL-SCNC: 139 MMOL/L (ref 134–144)
TRIGL SERPL-MCNC: 72 MG/DL (ref 0–149)
TSH SERPL DL<=0.005 MIU/L-ACNC: 0.69 UIU/ML (ref 0.45–4.5)
VIT B12 SERPL-MCNC: 344 PG/ML (ref 232–1245)
VLDLC SERPL CALC-MCNC: 14 MG/DL (ref 5–40)
WBC # BLD AUTO: 4.7 X10E3/UL (ref 3.4–10.8)

## 2018-10-21 NOTE — PROGRESS NOTES
RECOMMENDATIONS: 
None. Keep up the good work! Work on diet and exercise. Your iron b12 and vitamin D have all improved from last visit. Recheck this test: 6 months.

## 2018-10-23 ENCOUNTER — DOCUMENTATION ONLY (OUTPATIENT)
Dept: FAMILY MEDICINE CLINIC | Age: 68
End: 2018-10-23

## 2019-02-04 RX ORDER — ERGOCALCIFEROL 1.25 MG/1
CAPSULE ORAL
Qty: 14 CAP | Refills: 0 | Status: SHIPPED | OUTPATIENT
Start: 2019-02-04

## 2019-02-04 RX ORDER — ARIPIPRAZOLE 2 MG/1
TABLET ORAL
Qty: 30 TAB | Refills: 5 | Status: SHIPPED | OUTPATIENT
Start: 2019-02-04 | End: 2019-10-01 | Stop reason: SDUPTHER

## 2019-08-09 DIAGNOSIS — F32.A DEPRESSION, UNSPECIFIED DEPRESSION TYPE: ICD-10-CM

## 2019-08-09 RX ORDER — CYANOCOBALAMIN 1000 UG/ML
INJECTION, SOLUTION INTRAMUSCULAR; SUBCUTANEOUS
Qty: 1 ML | Refills: 12 | Status: SHIPPED | OUTPATIENT
Start: 2019-08-09 | End: 2020-09-24 | Stop reason: SDUPTHER

## 2019-08-09 RX ORDER — FLUOXETINE HYDROCHLORIDE 20 MG/1
CAPSULE ORAL
Qty: 270 CAP | Refills: 5 | Status: SHIPPED | OUTPATIENT
Start: 2019-08-09 | End: 2019-08-12 | Stop reason: SDUPTHER

## 2019-08-12 DIAGNOSIS — F32.A DEPRESSION, UNSPECIFIED DEPRESSION TYPE: ICD-10-CM

## 2019-08-12 RX ORDER — FLUOXETINE HYDROCHLORIDE 20 MG/1
CAPSULE ORAL
Qty: 270 CAP | Refills: 5 | Status: SHIPPED | OUTPATIENT
Start: 2019-08-12 | End: 2020-09-10 | Stop reason: SDUPTHER

## 2019-10-01 RX ORDER — ARIPIPRAZOLE 2 MG/1
TABLET ORAL
Qty: 30 TAB | Refills: 5 | Status: SHIPPED | OUTPATIENT
Start: 2019-10-01 | End: 2020-06-14

## 2020-06-11 ENCOUNTER — OFFICE VISIT (OUTPATIENT)
Dept: PRIMARY CARE CLINIC | Age: 70
End: 2020-06-11

## 2020-06-11 DIAGNOSIS — Z20.822 EXPOSURE TO COVID-19 VIRUS: Primary | ICD-10-CM

## 2020-06-14 LAB — SARS-COV-2, NAA: NOT DETECTED

## 2020-06-14 RX ORDER — ARIPIPRAZOLE 2 MG/1
TABLET ORAL
Qty: 30 TAB | Refills: 0 | Status: SHIPPED | OUTPATIENT
Start: 2020-06-14 | End: 2020-09-10 | Stop reason: SDUPTHER

## 2020-07-06 ENCOUNTER — TELEPHONE (OUTPATIENT)
Dept: FAMILY MEDICINE CLINIC | Age: 70
End: 2020-07-06

## 2020-07-06 RX ORDER — SYRINGE W-NEEDLE,DISPOSAB,3 ML 25GX5/8"
SYRINGE, EMPTY DISPOSABLE MISCELLANEOUS
Qty: 1 SYRINGE | Refills: 5 | Status: SHIPPED | OUTPATIENT
Start: 2020-07-06 | End: 2020-09-10 | Stop reason: SDUPTHER

## 2020-07-06 NOTE — TELEPHONE ENCOUNTER
Patient requesting syringe's for B12 injection. Patient has Virtual Appointment [phone]. Pharmacy: on file.  Patient's phone: [phone]

## 2020-07-09 ENCOUNTER — VIRTUAL VISIT (OUTPATIENT)
Dept: FAMILY MEDICINE CLINIC | Age: 70
End: 2020-07-09

## 2020-09-10 DIAGNOSIS — F32.A DEPRESSION, UNSPECIFIED DEPRESSION TYPE: ICD-10-CM

## 2020-09-10 RX ORDER — FLUOXETINE HYDROCHLORIDE 20 MG/1
CAPSULE ORAL
Qty: 270 CAP | Refills: 5 | Status: SHIPPED | OUTPATIENT
Start: 2020-09-10 | End: 2022-01-09

## 2020-09-10 RX ORDER — ARIPIPRAZOLE 2 MG/1
TABLET ORAL
Qty: 30 TAB | Refills: 2 | Status: SHIPPED | OUTPATIENT
Start: 2020-09-10 | End: 2021-01-18

## 2020-09-10 RX ORDER — CYANOCOBALAMIN 1000 UG/ML
INJECTION, SOLUTION INTRAMUSCULAR; SUBCUTANEOUS
Qty: 1 VIAL | Refills: 0
Start: 2020-09-10

## 2020-09-10 RX ORDER — SYRINGE W-NEEDLE,DISPOSAB,3 ML 25GX5/8"
SYRINGE, EMPTY DISPOSABLE MISCELLANEOUS
Qty: 1 SYRINGE | Refills: 5 | Status: SHIPPED | OUTPATIENT
Start: 2020-09-10 | End: 2020-09-24 | Stop reason: SDUPTHER

## 2020-09-24 ENCOUNTER — HOSPITAL ENCOUNTER (OUTPATIENT)
Dept: LAB | Age: 70
Discharge: HOME | End: 2020-09-24

## 2020-09-24 ENCOUNTER — OFFICE VISIT (OUTPATIENT)
Dept: FAMILY MEDICINE CLINIC | Age: 70
End: 2020-09-24
Payer: MEDICARE

## 2020-09-24 VITALS
DIASTOLIC BLOOD PRESSURE: 67 MMHG | BODY MASS INDEX: 29.86 KG/M2 | HEART RATE: 57 BPM | HEIGHT: 68 IN | TEMPERATURE: 98.9 F | WEIGHT: 197 LBS | SYSTOLIC BLOOD PRESSURE: 108 MMHG | RESPIRATION RATE: 16 BRPM | OXYGEN SATURATION: 94 %

## 2020-09-24 DIAGNOSIS — D50.9 IRON DEFICIENCY ANEMIA, UNSPECIFIED IRON DEFICIENCY ANEMIA TYPE: ICD-10-CM

## 2020-09-24 DIAGNOSIS — E55.9 VITAMIN D DEFICIENCY: ICD-10-CM

## 2020-09-24 DIAGNOSIS — Z12.11 SCREENING FOR COLON CANCER: ICD-10-CM

## 2020-09-24 DIAGNOSIS — Z12.31 SCREENING MAMMOGRAM, ENCOUNTER FOR: ICD-10-CM

## 2020-09-24 DIAGNOSIS — Z00.00 WELL ADULT EXAM: Primary | ICD-10-CM

## 2020-09-24 DIAGNOSIS — D51.0 PERNICIOUS ANEMIA: ICD-10-CM

## 2020-09-24 PROCEDURE — G8536 NO DOC ELDER MAL SCRN: HCPCS | Performed by: NURSE PRACTITIONER

## 2020-09-24 PROCEDURE — 3017F COLORECTAL CA SCREEN DOC REV: CPT | Performed by: NURSE PRACTITIONER

## 2020-09-24 PROCEDURE — 1090F PRES/ABSN URINE INCON ASSESS: CPT | Performed by: NURSE PRACTITIONER

## 2020-09-24 PROCEDURE — 99214 OFFICE O/P EST MOD 30 MIN: CPT | Performed by: NURSE PRACTITIONER

## 2020-09-24 PROCEDURE — G9899 SCRN MAM PERF RSLTS DOC: HCPCS | Performed by: NURSE PRACTITIONER

## 2020-09-24 PROCEDURE — G8427 DOCREV CUR MEDS BY ELIG CLIN: HCPCS | Performed by: NURSE PRACTITIONER

## 2020-09-24 PROCEDURE — G8432 DEP SCR NOT DOC, RNG: HCPCS | Performed by: NURSE PRACTITIONER

## 2020-09-24 PROCEDURE — G0439 PPPS, SUBSEQ VISIT: HCPCS | Performed by: NURSE PRACTITIONER

## 2020-09-24 PROCEDURE — G8419 CALC BMI OUT NRM PARAM NOF/U: HCPCS | Performed by: NURSE PRACTITIONER

## 2020-09-24 PROCEDURE — 1101F PT FALLS ASSESS-DOCD LE1/YR: CPT | Performed by: NURSE PRACTITIONER

## 2020-09-24 RX ORDER — CYANOCOBALAMIN 1000 UG/ML
INJECTION, SOLUTION INTRAMUSCULAR; SUBCUTANEOUS
Qty: 1 ML | Refills: 12 | Status: SHIPPED | OUTPATIENT
Start: 2020-09-24

## 2020-09-24 RX ORDER — SYRINGE W-NEEDLE,DISPOSAB,3 ML 25GX5/8"
SYRINGE, EMPTY DISPOSABLE MISCELLANEOUS
Qty: 1 SYRINGE | Refills: 5 | Status: SHIPPED | OUTPATIENT
Start: 2020-09-24

## 2020-09-24 NOTE — PROGRESS NOTES
Chief Complaint   Patient presents with    Medication Refill     Pt in office today for med refill  -b12 refill    1. Have you been to the ER, urgent care clinic since your last visit? Hospitalized since your last visit? No    2. Have you seen or consulted any other health care providers outside of the [address] [place] [address] since your last visit? Include any pap smears or colon screening.  No     Pt has no other concerns

## 2020-09-24 NOTE — PATIENT INSTRUCTIONS
Medicare Wellness Visit, Female The best way to live healthy is to have a lifestyle where you eat a well-balanced diet, exercise regularly, limit alcohol use, and quit all forms of tobacco/nicotine, if applicable. Regular preventive services are another way to keep healthy. Preventive services (vaccines, screening tests, monitoring & exams) can help personalize your care plan, which helps you manage your own care. Screening tests can find health problems at the earliest stages, when they are easiest to treat. [facility] follows the current, evidence-based guidelines published by the [facility] ([facility]) when recommending preventive services for our patients. Because we follow these guidelines, sometimes recommendations change over time as research supports it. (For example, mammograms used to be recommended annually. Even though Medicare will still pay for an annual mammogram, the newer guidelines recommend a mammogram every two years for women of average risk). Of course, you and your doctor may decide to screen more often for some diseases, based on your risk and your co-morbidities (chronic disease you are already diagnosed with). Preventive services for you include: - Medicare offers their members a free annual wellness visit, which is time for you and your primary care provider to discuss and plan for your preventive service needs. Take advantage of this benefit every year! 
-All adults over the age of 72 should receive the recommended pneumonia vaccines. Current USPSTF guidelines recommend a series of two vaccines for the best pneumonia protection.  
-All adults should have a flu vaccine yearly and a tetanus vaccine every 10 years.  
-All adults age 48 and older should receive the shingles vaccines (series of two vaccines). -All adults age 38-68 who are overweight should have a diabetes screening test once every three years. -All adults born between [birth year] and 1965 should be screened once for Hepatitis C. 
-Other screening tests and preventive services for persons with diabetes include: an eye exam to screen for diabetic retinopathy, a kidney function test, a foot exam, and stricter control over your cholesterol.  
-Cardiovascular screening for adults with routine risk involves an electrocardiogram (ECG) at intervals determined by your doctor.  
-Colorectal cancer screenings should be done for adults age 54-65 with no increased risk factors for colorectal cancer. There are a number of acceptable methods of screening for this type of cancer. Each test has its own benefits and drawbacks. Discuss with your doctor what is most appropriate for you during your annual wellness visit. The different tests include: colonoscopy (considered the best screening method), a fecal occult blood test, a fecal DNA test, and sigmoidoscopy. 
 
-A bone mass density test is recommended when a woman turns 65 to screen for osteoporosis. This test is only recommended one time, as a screening. Some providers will use this same test as a disease monitoring tool if you already have osteoporosis. -Breast cancer screenings are recommended every other year for women of normal risk, age 54-69. 
-Cervical cancer screenings for women over age 72 are only recommended with certain risk factors. Here is a list of your current Health Maintenance items (your personalized list of preventive services) with a due date: 
Health Maintenance Due Topic Date Due  
 Colonoscopy  11/28/1968  DTaP/Tdap/Td  (1 - Tdap) 11/28/1971  Shingles Vaccine (1 of 2) 11/28/2000  Glaucoma Screening   11/28/2015  Mammogram  01/11/2018 [address] [place] [address] Annual Well Visit  10/17/2019  Yearly Flu Vaccine (1) 09/01/2020

## 2020-09-24 NOTE — PROGRESS NOTES
This is the Subsequent Medicare Annual Wellness Exam, performed 12 months or more after the Initial AWV or the last Subsequent AWV    I have reviewed the patient's medical history in detail and updated the computerized patient record.      History     Patient Active Problem List   Diagnosis Code    Vitamin D deficiency E55.9    Osteoporosis M81.0    Bloody discharge from left nipple N64.52    Sebaceous cyst L72.3    Scalp mass R22.0    Atypical mole D22.9    Hx pulmonary embolism Z80.36    S/P gastric bypass Z98.84    Osteoporosis without current pathological fracture M81.0     Past Medical History:   Diagnosis Date    Anemia NEC     Arthritis     bilat hands    Black stools     Bowel obstruction ([address] 10/24/2011    Chronic pain     left ankle    Dyspepsia and other specified disorders of function of stomach     Hair loss     Hypoglycemia, unspecified     related to hx bi-pass surg    Hypotension 11/2011    Liver disease     Hepatitis as child food borne    Morbid obesity ([address]     gastric bypass    Other ill-defined conditions(799.89)     bronchitis occaisionally    Other ill-defined conditions(799.89) 2012    cellulitis right leg,     Other ill-defined conditions(799.89)     hx of hypotenion and bradycardia    Other ill-defined conditions(799.89)     osteoporosis    Other ill-defined conditions(799.89)     depression    Other ill-defined conditions(799.89) 12/12/12    MRSA    PUD (peptic ulcer disease) 5-2013    bleeding ulcer- from NSAIDS- no transfusion    Pulmonary embolus ([address] 11/3/2011    Teeth problem     Thromboembolus ([address] 2011    post op from pic line, pe lung    Ulcers, marginal     GI      Past Surgical History:   Procedure Laterality Date    COLONOSCOPY  8/30/2013         EGD  8/30/2013         GASTRIC BYPASS,OBESE<150CM ROBERTO-EN-Y  1994    HX BREAST BIOPSY  7/14/2014    LEFT NIPPLE EXPLORATION AND DUCTAL EXCISION, EXCISION SCALP MASS  performed by Carmen Anderson MD at [address] HX BREAST LUMPECTOMY Left 2014    HX GASTRIC BYPASS      HX OPEN CHOLECYSTECTOMY      taaken with bypass    HX ORTHOPAEDIC      right ankle ORIF     Current Outpatient Medications   Medication Sig Dispense Refill    cyanocobalamin (VITAMIN B12) 1,000 mcg/mL injection INJECT 1 ML (CC) INTRAMUSCULARLY ONCE FOR 1 DOSE. REPEAT EVERY 30 DAYS 1 mL 12    Syringe with Needle, Disp, (Syringe 3cc/25Gx1\") 3 mL 25 gauge x 1\" syrg To use with b12 injection monthly 1 Syringe 5    FLUoxetine (PROzac) 20 mg capsule TAKE 3 CAPSULES BY MOUTH ONCE DAILY 270 Cap 5    ARIPiprazole (ABILIFY) 2 mg tablet Take 1 tablet by mouth once daily 30 Tab 2    ergocalciferol (VITAMIN D2) 50,000 unit capsule TAKE 1 CAPSULE BY MOUTH ONCE DAILY FOR 7 DAYS,  AND  THEN  ONE  CAPSULE  TWICE  A  WEEK  ON  TUESDAY  AND  FRIDAY 14 Cap 0    cyanocobalamin, vitamin B-12, (VITAMIN B-12 INJECTION) by Injection route. Has been getting them done @@ [facility]      acetaminophen (TYLENOL) 500 mg tablet Take  by mouth.  alendronate (FOSAMAX) 70 mg tablet TAKE ONE TABLET BY MOUTH EVERY  4 Tab 3    Cyanocobalamin, Vitamin B-12, 250 mcg Chew Take 1 Cap by mouth daily. Not taking,      FOLIC ACID PO Take 1 Tab by mouth daily.  multivitamin (ONE A DAY) tablet Take 1 Tab by mouth daily.        Allergies   Allergen Reactions    Nsaids (Non-Steroidal Anti-Inflammatory Drug) Other (comments)     Bleeding ulcers      Percocet [Oxycodone-Acetaminophen] Nausea and Vomiting       Family History   Problem Relation Age of Onset    Cancer Mother         colon    Heart Disease Father     Alcohol abuse Brother             Cancer Maternal Aunt         colon     Social History     Tobacco Use    Smoking status: Never Smoker    Smokeless tobacco: Never Used   Substance Use Topics    Alcohol use: No       Depression Risk Factor Screening:     3 most recent PHQ Screens 2020   PHQ Not Done Active Diagnosis of Depression or Bipolar Disorder   Little interest or pleasure in doing things -   Feeling down, depressed, irritable, or hopeless -   Total Score PHQ 2 -       Alcohol Risk Screen   Do you average more than 1 drink per night or more than 7 drinks a week:  No    On any one occasion in the past three months have you have had more than 3 drinks containing alcohol:  No        Functional Ability and Level of Safety:   Hearing: Hearing is good. Activities of Daily Living: The home contains: no safety equipment. Patient does total self care     Ambulation: with no difficulty     Fall Risk:  Fall Risk Assessment, last 12 mths 9/24/2020   Able to walk? Yes   Fall in past 12 months? No   Fall with injury? -   Number of falls in past 12 months -   Fall Risk Score -     Abuse Screen:  Patient is not abused       Cognitive Screening   Has your family/caregiver stated any concerns about your memory: no     Cognitive Screening: good recent recall of events    Patient Care Team   Patient Care Team:  Esvin Allen NP as PCP - General (Family Medicine)  Esvin Allen NP as PCP - [facility] Empaneled Provider    Assessment/Plan   Education and counseling provided:  Are appropriate based on today's review and evaluation    Diagnoses and all orders for this visit:    1. Well adult exam  -     LIPID PANEL; Future  -     METABOLIC PANEL, COMPREHENSIVE; Future  -     CBC WITH AUTOMATED DIFF; Future  -     TSH 3RD GENERATION; Future    2. Vitamin D deficiency  -     VITAMIN D, 25 HYDROXY; Future    3. Iron deficiency anemia, unspecified iron deficiency anemia type  -     CBC WITH AUTOMATED DIFF; Future    4. Pernicious anemia  -     CBC WITH AUTOMATED DIFF; Future    5. Screening mammogram, encounter for  -     ROBERTO MAMMO BI SCREENING INCL CAD; Future    6.  Screening for colon cancer  -     REFERRAL TO GASTROENTEROLOGY    Other orders  -     cyanocobalamin (VITAMIN B12) 1,000 mcg/mL injection; INJECT 1 ML (CC) INTRAMUSCULARLY ONCE FOR 1 DOSE. REPEAT EVERY 30 DAYS  -     Syringe with Needle, Disp, (Syringe 3cc/25Gx1\") 3 mL 25 gauge x 1\" syrg; To use with b12 injection monthly      I have discussed the diagnosis with the patient and the intended plan as seen in the above orders. The patient has received an after-visit summary and questions were answered concerning future plans. Patient conveyed understanding of the plan at the time of the visit.     Aj Mendoza, MSN, ANP  9/24/2020      Health Maintenance Due   Topic Date Due    Colonoscopy  11/28/1968    DTaP/Tdap/Td series (1 - Tdap) 11/28/1971    Shingrix Vaccine Age 50> (1 of 2) 11/28/2000    GLAUCOMA SCREENING Q2Y  11/28/2015    Breast Cancer Screen Mammogram  01/11/2018    Medicare Yearly Exam  10/17/2019    Flu Vaccine (1) 09/01/2020

## 2020-09-25 LAB
25(OH)D3 SERPL-MCNC: 11.6 NG/ML (ref 30–100)
ALBUMIN SERPL-MCNC: 3.5 G/DL (ref 3.5–5)
ALBUMIN/GLOB SERPL: 1.3 {RATIO} (ref 1.1–2.2)
ALP SERPL-CCNC: 179 U/L (ref 45–117)
ALT SERPL-CCNC: 20 U/L (ref 12–78)
ANION GAP SERPL CALC-SCNC: 7 MMOL/L (ref 5–15)
AST SERPL-CCNC: 18 U/L (ref 15–37)
BASOPHILS # BLD: 0 K/UL (ref 0–0.1)
BASOPHILS NFR BLD: 0 % (ref 0–1)
BILIRUB SERPL-MCNC: 0.5 MG/DL (ref 0.2–1)
BUN SERPL-MCNC: 16 MG/DL (ref 6–20)
BUN/CREAT SERPL: 23 (ref 12–20)
CALCIUM SERPL-MCNC: 8.6 MG/DL (ref 8.5–10.1)
CHLORIDE SERPL-SCNC: 112 MMOL/L (ref 97–108)
CHOLEST SERPL-MCNC: 138 MG/DL
CO2 SERPL-SCNC: 24 MMOL/L (ref 21–32)
CREAT SERPL-MCNC: 0.69 MG/DL (ref 0.55–1.02)
DIFFERENTIAL METHOD BLD: ABNORMAL
EOSINOPHIL # BLD: 0.2 K/UL (ref 0–0.4)
EOSINOPHIL NFR BLD: 3 % (ref 0–7)
ERYTHROCYTE [DISTWIDTH] IN BLOOD BY AUTOMATED COUNT: 14.2 % (ref 11.5–14.5)
GLOBULIN SER CALC-MCNC: 2.7 G/DL (ref 2–4)
GLUCOSE SERPL-MCNC: 85 MG/DL (ref 65–100)
HCT VFR BLD AUTO: 42.1 % (ref 35–47)
HDLC SERPL-MCNC: 64 MG/DL
HDLC SERPL: 2.2 {RATIO} (ref 0–5)
HGB BLD-MCNC: 12.9 G/DL (ref 11.5–16)
IMM GRANULOCYTES # BLD AUTO: 0 K/UL (ref 0–0.04)
IMM GRANULOCYTES NFR BLD AUTO: 0 % (ref 0–0.5)
LDLC SERPL CALC-MCNC: 59.6 MG/DL (ref 0–100)
LIPID PROFILE,FLP: NORMAL
LYMPHOCYTES # BLD: 1.2 K/UL (ref 0.8–3.5)
LYMPHOCYTES NFR BLD: 24 % (ref 12–49)
MCH RBC QN AUTO: 30.5 PG (ref 26–34)
MCHC RBC AUTO-ENTMCNC: 30.6 G/DL (ref 30–36.5)
MCV RBC AUTO: 99.5 FL (ref 80–99)
MONOCYTES # BLD: 0.4 K/UL (ref 0–1)
MONOCYTES NFR BLD: 8 % (ref 5–13)
NEUTS SEG # BLD: 3.2 K/UL (ref 1.8–8)
NEUTS SEG NFR BLD: 65 % (ref 32–75)
NRBC # BLD: 0 K/UL (ref 0–0.01)
NRBC BLD-RTO: 0 PER 100 WBC
PLATELET # BLD AUTO: 219 K/UL (ref 150–400)
PMV BLD AUTO: 10.2 FL (ref 8.9–12.9)
POTASSIUM SERPL-SCNC: 4 MMOL/L (ref 3.5–5.1)
PROT SERPL-MCNC: 6.2 G/DL (ref 6.4–8.2)
RBC # BLD AUTO: 4.23 M/UL (ref 3.8–5.2)
SODIUM SERPL-SCNC: 143 MMOL/L (ref 136–145)
TRIGL SERPL-MCNC: 72 MG/DL (ref ?–150)
TSH SERPL DL<=0.05 MIU/L-ACNC: 0.81 UIU/ML (ref 0.36–3.74)
VLDLC SERPL CALC-MCNC: 14.4 MG/DL
WBC # BLD AUTO: 4.9 K/UL (ref 3.6–11)

## 2020-09-29 NOTE — PROGRESS NOTES
Please let her know that all labs look great except vit D. Does she have any of the once a week vit D to take? If not will send in and recheck 3 mo.  [place]

## 2020-10-08 ENCOUNTER — TELEPHONE (OUTPATIENT)
Dept: FAMILY MEDICINE CLINIC | Age: 70
End: 2020-10-08

## 2020-10-08 RX ORDER — ERGOCALCIFEROL 1.25 MG/1
50000 CAPSULE ORAL
Qty: 8 CAP | Refills: 5 | Status: SHIPPED | OUTPATIENT
Start: 2020-10-09 | End: 2020-11-03

## 2020-10-30 DIAGNOSIS — Z12.31 SCREENING MAMMOGRAM, ENCOUNTER FOR: ICD-10-CM

## 2021-01-18 RX ORDER — ARIPIPRAZOLE 2 MG/1
TABLET ORAL
Qty: 30 TAB | Refills: 2 | Status: SHIPPED | OUTPATIENT
Start: 2021-01-18 | End: 2022-02-08

## 2022-01-07 DIAGNOSIS — F32.A DEPRESSION, UNSPECIFIED DEPRESSION TYPE: ICD-10-CM

## 2022-01-09 RX ORDER — FLUOXETINE HYDROCHLORIDE 20 MG/1
CAPSULE ORAL
Qty: 270 CAPSULE | Refills: 5 | Status: SHIPPED | OUTPATIENT
Start: 2022-01-09

## 2022-02-08 RX ORDER — ARIPIPRAZOLE 2 MG/1
TABLET ORAL
Qty: 30 TABLET | Refills: 2 | Status: SHIPPED | OUTPATIENT
Start: 2022-02-08

## 2022-03-18 PROBLEM — Z86.711 HX PULMONARY EMBOLISM: Status: ACTIVE | Noted: 2018-05-21

## 2022-03-19 PROBLEM — M81.0 OSTEOPOROSIS WITHOUT CURRENT PATHOLOGICAL FRACTURE: Status: ACTIVE | Noted: 2018-10-16

## 2022-03-19 PROBLEM — Z98.84 S/P GASTRIC BYPASS: Status: ACTIVE | Noted: 2018-05-21

## 2023-02-14 DIAGNOSIS — F32.A DEPRESSION, UNSPECIFIED DEPRESSION TYPE: ICD-10-CM

## 2023-02-14 RX ORDER — FLUOXETINE HYDROCHLORIDE 20 MG/1
CAPSULE ORAL
Qty: 270 CAPSULE | Refills: 5 | Status: SHIPPED | OUTPATIENT
Start: 2023-02-14

## 2023-03-07 ENCOUNTER — OFFICE VISIT (OUTPATIENT)
Dept: SURGERY | Age: 73
End: 2023-03-07
Payer: MEDICARE

## 2023-03-07 VITALS
TEMPERATURE: 96.9 F | HEIGHT: 68 IN | BODY MASS INDEX: 20.16 KG/M2 | DIASTOLIC BLOOD PRESSURE: 71 MMHG | OXYGEN SATURATION: 97 % | SYSTOLIC BLOOD PRESSURE: 113 MMHG | WEIGHT: 133 LBS | HEART RATE: 69 BPM | RESPIRATION RATE: 18 BRPM

## 2023-03-07 DIAGNOSIS — L72.9 SCALP CYST: Primary | ICD-10-CM

## 2023-03-07 PROCEDURE — G8428 CUR MEDS NOT DOCUMENT: HCPCS | Performed by: SURGERY

## 2023-03-07 PROCEDURE — G8420 CALC BMI NORM PARAMETERS: HCPCS | Performed by: SURGERY

## 2023-03-07 PROCEDURE — G9717 DOC PT DX DEP/BP F/U NT REQ: HCPCS | Performed by: SURGERY

## 2023-03-07 PROCEDURE — G8536 NO DOC ELDER MAL SCRN: HCPCS | Performed by: SURGERY

## 2023-03-07 PROCEDURE — 1090F PRES/ABSN URINE INCON ASSESS: CPT | Performed by: SURGERY

## 2023-03-07 PROCEDURE — 1101F PT FALLS ASSESS-DOCD LE1/YR: CPT | Performed by: SURGERY

## 2023-03-07 PROCEDURE — 1123F ACP DISCUSS/DSCN MKR DOCD: CPT | Performed by: SURGERY

## 2023-03-07 PROCEDURE — 99203 OFFICE O/P NEW LOW 30 MIN: CPT | Performed by: SURGERY

## 2023-03-07 PROCEDURE — 3017F COLORECTAL CA SCREEN DOC REV: CPT | Performed by: SURGERY

## 2023-03-07 RX ORDER — METHOCARBAMOL 500 MG/1
TABLET, FILM COATED ORAL
COMMUNITY
Start: 2023-03-05

## 2023-03-07 RX ORDER — METHYLPREDNISOLONE 4 MG/1
TABLET ORAL
COMMUNITY
Start: 2023-03-05

## 2023-03-07 NOTE — PROGRESS NOTES
Identified pt with two pt identifiers (name and ). Reviewed chart in preparation for visit and have obtained necessary documentation. Melanie Chandler is a 67 y.o. female  Chief Complaint   Patient presents with    Skin Problem     Self referred, lipomas on scalp. Visit Vitals  /71 (BP 1 Location: Left upper arm, BP Patient Position: Sitting, BP Cuff Size: Small adult)   Pulse 69   Temp 96.9 °F (36.1 °C) (Temporal)   Resp 18   Ht 5' 8\" (1.727 m)   Wt 60.3 kg (133 lb)   SpO2 97%   BMI 20.22 kg/m²       1. Have you been to the ER, urgent care clinic since your last visit? Hospitalized since your last visit? No    2. Have you seen or consulted any other health care providers outside of the [address] [place] [address] since your last visit? Include any pap smears or colon screening.  No

## 2023-03-07 NOTE — PROGRESS NOTES
Surgery History and Physical    Subjective:     Mini Trejo is a 67 y.o. female who presents for evaluation of scalp lumps. Its sore. She has had them for a couple of years. Dr. Aaron Palafox removed one from her neck and breast when he was at [address] [place] [address]. Its getting bigger in size. She may have to go to an endocrinologist for hypoglycemia. He had a PE in 2011 - after a foot injury - she was coumadin for about 1 year. She has had 50 lbs unintentional weight loss - she had a colonoscopy 5 years ago.      Past Medical History:   Diagnosis Date    Anemia NEC     Arthritis     bilat hands    Black stools     Bowel obstruction ([address] 10/24/2011    Chronic pain     left ankle    Depression     Dyspepsia and other specified disorders of function of stomach     Hair loss     Hypoglycemia     Hypoglycemia, unspecified     related to hx bi-pass surg    Hypotension 11/2011    Liver disease     Hepatitis as child food borne    Morbid obesity ([address]     gastric bypass    Other ill-defined conditions(799.89)     bronchitis occaisionally    Other ill-defined conditions(799.89) 2012    cellulitis right leg,     Other ill-defined conditions(799.89)     hx of hypotenion and bradycardia    Other ill-defined conditions(799.89)     osteoporosis    Other ill-defined conditions(799.89)     depression    Other ill-defined conditions(799.89) 12/12/2012    MRSA    PUD (peptic ulcer disease) 05/2013    bleeding ulcer- from NSAIDS- no transfusion    Pulmonary embolus ([address] 11/03/2011    Teeth problem     Thromboembolus ([address] 2011    post op from pic line, pe lung    Ulcers, marginal     GI     Past Surgical History:   Procedure Laterality Date    COLONOSCOPY  8/30/2013         EGD  8/30/2013         HX BREAST BIOPSY  7/14/2014    LEFT NIPPLE EXPLORATION AND DUCTAL EXCISION, EXCISION SCALP MASS  performed by Radha Chan MD at [facility]    HX BREAST LUMPECTOMY Left 08/2014    HX GASTRIC BYPASS  1994    HX OPEN CHOLECYSTECTOMY      taaken with bypass    HX ORTHOPAEDIC      right ankle ORIF    HI GASTRIC RSTCV W/BYP W/SHORT LIMB 150 CM/<  1994      Family History   Problem Relation Age of Onset    Cancer Mother         colon    Heart Disease Father     Alcohol abuse Brother             Cancer Maternal Aunt         colon     Social History     Tobacco Use    Smoking status: Never    Smokeless tobacco: Never   Substance Use Topics    Alcohol use: No      Prior to Admission medications    Medication Sig Start Date End Date Taking? Authorizing Provider   methocarbamoL (ROBAXIN) 500 mg tablet  3/5/23  Yes Provider, Historical   methylPREDNISolone (MEDROL DOSEPACK) 4 mg tablet  3/5/23  Yes Provider, Historical   FLUoxetine (PROzac) 20 mg capsule take 3 capsules by mouth once daily 23  Yes Funmilayo Bazan NP   ARIPiprazole (ABILIFY) 2 mg tablet take 1 tablet by mouth once daily 22  Yes Funmilayo Bazan NP   cyanocobalamin (VITAMIN B12) 1,000 mcg/mL injection INJECT 1 ML (CC) INTRAMUSCULARLY ONCE FOR 1 DOSE. REPEAT EVERY 30 DAYS 20  Yes Funmilayo Bazan NP   Syringe with Needle, Disp, (Syringe 3cc/25Gx1\") 3 mL 25 gauge x 1\" syrg To use with b12 injection monthly 20  Yes Funmilayo Bazan NP   ergocalciferol (VITAMIN D2) 50,000 unit capsule TAKE 1 CAPSULE BY MOUTH ONCE DAILY FOR 7 DAYS,  AND  THEN  ONE  CAPSULE  TWICE  A  WEEK  ON  TUESDAY  AND  19  Yes Funmilayo Bazan NP   acetaminophen (TYLENOL) 500 mg tablet Take  by mouth. 18  Yes Provider, Historical   alendronate (FOSAMAX) 70 mg tablet TAKE ONE TABLET BY MOUTH EVERY 16  Yes Timothy Bang, DO   FOLIC ACID PO Take 1 Tab by mouth daily. Yes Provider, Historical   multivitamin (ONE A DAY) tablet Take 1 Tab by mouth daily. Yes Provider, Historical   Cyanocobalamin, Vitamin B-12, 250 mcg Chew Take 1 Cap by mouth daily.  Not taking,  Patient not taking: Reported on 3/7/2023    Provider, Historical      Allergies   Allergen Reactions Hydrocodone-Acetaminophen Nausea Only    Nsaids (Non-Steroidal Anti-Inflammatory Drug) Other (comments)     Bleeding ulcers      Percocet [Oxycodone-Acetaminophen] Nausea and Vomiting       Review of Systems:  A comprehensive review of systems was negative except for that written in the History of Present Illness. Objective:   Visit Vitals  /71 (BP 1 Location: Left upper arm, BP Patient Position: Sitting, BP Cuff Size: Small adult)   Pulse 69   Temp 96.9 °F (36.1 °C) (Temporal)   Resp 18   Ht 5' 8\" (1.727 m)   Wt 60.3 kg (133 lb)   SpO2 97%   BMI 20.22 kg/m²         Physical Exam:  Physical Exam:  General:  Alert, cooperative, no distress, appears stated age. Eyes:  Conjunctivae/corneas clear. Ears:  Normal external ear canals both ears. Nose: Nares normal. Septum midline. Mouth/Throat: Lips, mucosa, and tongue normal. Teeth and gums normal.   Neck: Supple, symmetrical, trachea midline   Back:   Symmetric, no curvature. ROM normal.    Lungs:   Clear to auscultation bilaterally. Heart:  Regular rate and rhythm   Abdomen:   Soft, non-tender. Bowel sounds normal. No masses,  No organomegaly. Extremities: Extremities normal, atraumatic, no cyanosis or edema.    Skin: 2 mm scalp cyst         Assessment:     67year old female with 50+ lbs unintentional weight loss and hypoglycemia presents for a 2 mm scalp cyst    Plan:     The scalp cyst is very small and not emergent - she had one removed in the past by Dr. Krystina Donovan  Patient has other medical problems that need to be worked up first  Follow up in the office if it gets bigger and we can rediscuss cyst excision

## 2024-02-19 RX ORDER — FLUOXETINE HYDROCHLORIDE 20 MG/1
CAPSULE ORAL
Qty: 270 CAPSULE | OUTPATIENT
Start: 2024-02-19